# Patient Record
Sex: MALE | Race: WHITE | Employment: FULL TIME | URBAN - METROPOLITAN AREA
[De-identification: names, ages, dates, MRNs, and addresses within clinical notes are randomized per-mention and may not be internally consistent; named-entity substitution may affect disease eponyms.]

---

## 2019-07-07 ENCOUNTER — OFFICE VISIT (OUTPATIENT)
Dept: URGENT CARE | Age: 64
End: 2019-07-07

## 2019-07-07 VITALS
DIASTOLIC BLOOD PRESSURE: 84 MMHG | OXYGEN SATURATION: 98 % | BODY MASS INDEX: 28.95 KG/M2 | HEIGHT: 68 IN | WEIGHT: 191 LBS | SYSTOLIC BLOOD PRESSURE: 129 MMHG | HEART RATE: 87 BPM | TEMPERATURE: 98.6 F | RESPIRATION RATE: 18 BRPM

## 2019-07-07 DIAGNOSIS — M70.22 OLECRANON BURSITIS OF LEFT ELBOW: Primary | ICD-10-CM

## 2019-07-07 RX ORDER — TADALAFIL 5 MG/1
TABLET ORAL
Refills: 2 | COMMUNITY
Start: 2019-06-24

## 2019-07-07 RX ORDER — CEFDINIR 300 MG/1
300 CAPSULE ORAL 2 TIMES DAILY
Qty: 20 CAP | Refills: 0 | Status: SHIPPED | OUTPATIENT
Start: 2019-07-07 | End: 2019-08-06

## 2019-07-07 RX ORDER — LISINOPRIL 40 MG/1
TABLET ORAL
Refills: 4 | COMMUNITY
Start: 2019-05-03

## 2019-07-07 RX ORDER — ROSUVASTATIN CALCIUM 10 MG/1
10 TABLET, COATED ORAL DAILY
Refills: 2 | COMMUNITY
Start: 2019-04-26

## 2019-07-07 RX ORDER — DICLOFENAC SODIUM 75 MG/1
75 TABLET, DELAYED RELEASE ORAL 2 TIMES DAILY
Qty: 30 TAB | Refills: 0 | Status: SHIPPED | OUTPATIENT
Start: 2019-07-07 | End: 2019-08-06

## 2019-07-07 RX ORDER — MELOXICAM 15 MG/1
TABLET ORAL
Refills: 3 | COMMUNITY
Start: 2019-06-17 | End: 2022-10-20

## 2019-07-07 RX ORDER — AMOXICILLIN AND CLAVULANATE POTASSIUM 500; 125 MG/1; MG/1
TABLET, FILM COATED ORAL
Refills: 3 | COMMUNITY
Start: 2019-06-27 | End: 2019-08-06

## 2019-07-07 NOTE — PROGRESS NOTES
Elbow Pain          Past Medical History:   Diagnosis Date    Hypertension         History reviewed. No pertinent surgical history. History reviewed. No pertinent family history. Social History     Socioeconomic History    Marital status:      Spouse name: Not on file    Number of children: Not on file    Years of education: Not on file    Highest education level: Not on file   Occupational History    Not on file   Social Needs    Financial resource strain: Not on file    Food insecurity:     Worry: Not on file     Inability: Not on file    Transportation needs:     Medical: Not on file     Non-medical: Not on file   Tobacco Use    Smoking status: Never Smoker    Smokeless tobacco: Never Used   Substance and Sexual Activity    Alcohol use: Not on file    Drug use: Not on file    Sexual activity: Not on file   Lifestyle    Physical activity:     Days per week: Not on file     Minutes per session: Not on file    Stress: Not on file   Relationships    Social connections:     Talks on phone: Not on file     Gets together: Not on file     Attends Confucianist service: Not on file     Active member of club or organization: Not on file     Attends meetings of clubs or organizations: Not on file     Relationship status: Not on file    Intimate partner violence:     Fear of current or ex partner: Not on file     Emotionally abused: Not on file     Physically abused: Not on file     Forced sexual activity: Not on file   Other Topics Concern    Not on file   Social History Narrative    Not on file                ALLERGIES: Patient has no known allergies. Review of Systems    Vitals:    07/07/19 1305   BP: 129/84   Pulse: 87   Resp: 18   Temp: 98.6 °F (37 °C)   SpO2: 98%   Weight: 191 lb (86.6 kg)   Height: 5' 8\" (1.727 m)       Physical Exam   Musculoskeletal:        Left elbow: He exhibits decreased range of motion and swelling (with erythema and diffuse tenderness). Tenderness found. Olecranon process tenderness noted. Left forearm: He exhibits tenderness and swelling (with erythema ). Arms:  Nursing note and vitals reviewed. MDM    Procedures      ICD-10-CM ICD-9-CM    1. Olecranon bursitis of left elbow M70.22 726.33     with infection ? Medications Ordered Today   Medications    diclofenac EC (VOLTAREN) 75 mg EC tablet     Sig: Take 1 Tab by mouth two (2) times a day. Dispense:  30 Tab     Refill:  0    cefdinir (OMNICEF) 300 mg capsule     Sig: Take 1 Cap by mouth two (2) times a day. Dispense:  20 Cap     Refill:  0     No results found for any visits on 07/07/19. The patients condition was discussed with the patient and they understand. The patient is to follow up with primary care doctor. If signs and symptoms become worse the pt is to go to the ER. The patient is to take medications as prescribed.

## 2019-07-07 NOTE — PATIENT INSTRUCTIONS
Bursitis of the Elbow: Care Instructions  Your Care Instructions  Bursitis is pain and swelling of the bursae. These are sacs of fluid that help your joints move smoothly. Olecranon bursitis is a type of bursitis that affects the back of the elbow. This is sometimes called Kristopher elbow because the bump that develops looks like the cartoon character Kristopher's elbow. Injury, overuse, or prolonged pressure on your elbow can cause this form of bursitis. Sometimes it happens when people have arthritis. It also can occur for unknown reasons. Treatment may include draining fluid from the bursa with a needle. If your doctor thought there was infection, he or she may have prescribed antibiotics. You also may get shots of medicine into the bursa to help the swelling go down. Your elbow should get better in a few days or weeks. Follow-up care is a key part of your treatment and safety. Be sure to make and go to all appointments, and call your doctor if you are having problems. It's also a good idea to know your test results and keep a list of the medicines you take. How can you care for yourself at home? · Take pain medicines exactly as directed. ? If the doctor gave you a prescription medicine for pain, take it as prescribed. ? If you are not taking a prescription pain medicine, ask your doctor if you can take an over-the-counter medicine. ? Do not take two or more pain medicines at the same time unless the doctor told you to. Many pain medicines have acetaminophen, which is Tylenol. Too much acetaminophen (Tylenol) can be harmful. · If your doctor prescribed antibiotics, take them as directed. Do not stop taking them just because you feel better. You need to take the full course of antibiotics. · If your doctor gave you a sling, an elastic bandage, or a compression sleeve, wear it exactly as instructed. · Put ice or a cold pack on your elbow for 10 to 20 minutes at a time.  Try to do this every 1 to 2 hours for the next 3 days (when you are awake) or until the swelling goes down. Put a thin cloth between the ice and your skin. · After 3 days, you can try heat, or alternate heat and ice. · Rest your elbow. Try to stop or reduce any activity that causes pain. · Wear elbow pads during physical activity to prevent injury. · Do not lean your elbows on tables or armrests. When should you call for help? Call your doctor now or seek immediate medical care if:    · You have new or worse symptoms of infection, such as:  ? Increased pain, swelling, warmth, or redness. ? Red streaks leading from the area. ? Pus draining from the area. ? A fever.    Watch closely for changes in your health, and be sure to contact your doctor if:    · You do not get better as expected. Where can you learn more? Go to http://kirk-effie.info/. Enter  in the search box to learn more about \"Bursitis of the Elbow: Care Instructions. \"  Current as of: September 20, 2018  Content Version: 11.9  © 8569-0835 [employer]. Care instructions adapted under license by [employer] (which disclaims liability or warranty for this information). If you have questions about a medical condition or this instruction, always ask your healthcare professional. [address] any warranty or liability for your use of this information. Elbow Bursitis: Exercises  Your Care Instructions  Here are some examples of typical rehabilitation exercises for your condition. Start each exercise slowly. Ease off the exercise if you start to have pain. Your doctor or physical therapist will tell you when you can start these exercises and which ones will work best for you. How to do the exercises  Elbow flexion stretch    1. Lift the arm that bothers you, and bend the elbow. Your palm should face toward you. 2. With your other hand, gently push on the back of your affected forearm.  Press your hand toward your shoulder until you feel a stretch in the back of your upper arm. 3. Hold for at least 15 to 30 seconds. 4. Repeat 2 to 4 times. Elbow extension stretch    1. Extend your affected arm in front of you with your palm facing away from you. 2. Bend back your wrist, pointing your hand up toward the ceiling. 3. With your other hand, gently bend your wrist farther until you feel a mild to moderate stretch in your forearm. 4. Hold for at least 15 to 30 seconds. 5. Repeat 2 to 4 times. 6. Repeat steps 1 through 5. But this time extend your affected arm in front of you with your palm facing up. Then bend back your wrist, pointing your hand toward the floor. Pronation and supination stretch    1. Keep your affected elbow at your side, bent at about 90 degrees. Grasp a pen, pencil, or stick, and wrap your hand around it. If you don't have something to hold on to, make a fist instead. 2. Slowly turn your forearm as far as you can back and forth in each direction. Your hand should face up and then down. 3. Hold each position for about 6 seconds. 4. Relax for up to 10 seconds between repetitions. 5. Repeat 8 to 12 times. Hand flips    1. While seated, place your affected forearm on your thigh. Your palm should face down. 2. Flip your hand over so the back of your hand rests on your thigh and your palm is up. Alternate between palm up and palm down while keeping your forearm on your thigh. 3. Repeat 8 to 12 times. Follow-up care is a key part of your treatment and safety. Be sure to make and go to all appointments, and call your doctor if you are having problems. It's also a good idea to know your test results and keep a list of the medicines you take. Where can you learn more? Go to http://kirk-effie.info/. Enter G771 in the search box to learn more about \"Elbow Bursitis: Exercises. \"  Current as of: September 20, 2018  Content Version: 11.9  © 1284-7897 [employer]. Care instructions adapted under license by [employer] (which disclaims liability or warranty for this information). If you have questions about a medical condition or this instruction, always ask your healthcare professional. [address] any warranty or liability for your use of this information.

## 2019-08-06 ENCOUNTER — OFFICE VISIT (OUTPATIENT)
Dept: URGENT CARE | Age: 64
End: 2019-08-06

## 2019-08-06 VITALS
BODY MASS INDEX: 29.51 KG/M2 | HEIGHT: 68 IN | SYSTOLIC BLOOD PRESSURE: 124 MMHG | DIASTOLIC BLOOD PRESSURE: 74 MMHG | WEIGHT: 194.7 LBS | OXYGEN SATURATION: 96 % | TEMPERATURE: 99.3 F | RESPIRATION RATE: 18 BRPM | HEART RATE: 88 BPM

## 2019-08-06 DIAGNOSIS — R07.89 CHEST DISCOMFORT: Primary | ICD-10-CM

## 2019-08-06 RX ORDER — AMOXICILLIN AND CLAVULANATE POTASSIUM 500; 125 MG/1; MG/1
TABLET, FILM COATED ORAL
COMMUNITY
End: 2021-09-23

## 2019-08-06 NOTE — PATIENT INSTRUCTIONS

## 2020-02-20 NOTE — PROGRESS NOTES
Pt needs refill on TOPROL-XL) 25 MG       [facility] [phone]    The history is provided by the patient. Chest Pain (Angina)    The history is provided by the patient. This is a recurrent problem. Episode onset: 3 days. The problem has not changed since onset. Episode frequency: intermittently. Associated with: nothing. The pain is present in the right side. The pain is at a severity of 2/10. The pain is mild. The quality of the pain is described as pressure-like, intermittent and dull. The pain does not radiate. Exacerbated by: nothing. Pertinent negatives include no abdominal pain, no back pain, no cough, no diaphoresis, no dizziness, no exertional chest pressure, no fever, no headaches, no irregular heartbeat, no leg pain, no lower extremity edema, no malaise/fatigue, no nausea, no numbness, no palpitations, no shortness of breath, no vomiting and no weakness. He has tried nothing for the symptoms. Risk factors include hypertension. His past medical history is significant for HTN. Patient states he have seen a cardiologist 6 months ago and everything showed normal.  Patient stated within the last 3 months his PCP has increased his HTN medication. Patient described the symptoms as more discomfort than pain. Denies SOB, Chest pain and dizziness. Past Medical History:   Diagnosis Date    Hypertension         History reviewed. No pertinent surgical history. History reviewed. No pertinent family history.      Social History     Socioeconomic History    Marital status:      Spouse name: Not on file    Number of children: Not on file    Years of education: Not on file    Highest education level: Not on file   Occupational History    Not on file   Social Needs    Financial resource strain: Not on file    Food insecurity:     Worry: Not on file     Inability: Not on file    Transportation needs:     Medical: Not on file     Non-medical: Not on file   Tobacco Use    Smoking status: Never Smoker    Smokeless tobacco: Never Used   Substance and Sexual Activity    Alcohol use: Not on file    Drug use: Not on file    Sexual activity: Not on file   Lifestyle    Physical activity:     Days per week: Not on file     Minutes per session: Not on file    Stress: Not on file   Relationships    Social connections:     Talks on phone: Not on file     Gets together: Not on file     Attends Yarsani service: Not on file     Active member of club or organization: Not on file     Attends meetings of clubs or organizations: Not on file     Relationship status: Not on file    Intimate partner violence:     Fear of current or ex partner: Not on file     Emotionally abused: Not on file     Physically abused: Not on file     Forced sexual activity: Not on file   Other Topics Concern    Not on file   Social History Narrative    Not on file            ALLERGIES: Patient has no known allergies. Review of Systems   Constitutional: Negative for chills, diaphoresis, fatigue, fever and malaise/fatigue. HENT: Negative. Eyes: Negative. Respiratory: Negative for apnea, cough, chest tightness, shortness of breath and wheezing. Cardiovascular: Positive for chest pain (more of chest discomfort). Negative for palpitations and leg swelling. Gastrointestinal: Negative. Negative for abdominal pain, nausea, rectal pain and vomiting. Genitourinary: Negative. Musculoskeletal: Negative. Negative for back pain. Skin: Negative. Neurological: Negative for dizziness, syncope, weakness, light-headedness, numbness and headaches. Vitals:    08/06/19 1304   BP: 124/74   Pulse: 88   Resp: 18   Temp: 99.3 °F (37.4 °C)   SpO2: 96%   Weight: 194 lb 11.2 oz (88.3 kg)   Height: 5' 8\" (1.727 m)       Physical Exam   Constitutional: He is oriented to person, place, and time. He appears well-developed and well-nourished. Eyes: Pupils are equal, round, and reactive to light. Neck: Normal range of motion. Cardiovascular: Normal rate, regular rhythm and normal heart sounds. Pulmonary/Chest: Effort normal and breath sounds normal. No respiratory distress. He has no wheezes. He has no rales. Abdominal: Soft. Bowel sounds are normal.   Musculoskeletal: Normal range of motion. Lymphadenopathy:     He has no cervical adenopathy. Neurological: He is alert and oriented to person, place, and time. Skin: Skin is warm and dry. Psychiatric: He has a normal mood and affect. MDM     Differential Diagnosis; Clinical Impression; Plan:     (R07.89) Chest discomfort  (primary encounter diagnosis)  No orders of the defined types were placed in this encounter. Informed patient of EKG results completed today which shows possible left atrial enlargement and nonspecific T wave abnormality. Education was given about the test result findings today. Advised patient to make a follow up appointment with PCP and cardiologist for a workup. Patient agreed with plan of care. The patients condition was discussed with the patient and they understand. Advised patient if signs and symptoms become worse to go to the ER. The patient is to take medications as prescribed. AVS given with patient instructions upon discharge. Patient agreed with plan of care.                   Procedures

## 2021-09-23 ENCOUNTER — OFFICE VISIT (OUTPATIENT)
Dept: INTERNAL MEDICINE CLINIC | Age: 66
End: 2021-09-23
Payer: MEDICARE

## 2021-09-23 VITALS
TEMPERATURE: 97.8 F | SYSTOLIC BLOOD PRESSURE: 127 MMHG | RESPIRATION RATE: 18 BRPM | HEIGHT: 68 IN | OXYGEN SATURATION: 95 % | BODY MASS INDEX: 28.92 KG/M2 | WEIGHT: 190.8 LBS | DIASTOLIC BLOOD PRESSURE: 80 MMHG | HEART RATE: 76 BPM

## 2021-09-23 DIAGNOSIS — L11.1 GROVER'S DISEASE: ICD-10-CM

## 2021-09-23 DIAGNOSIS — E78.2 MIXED HYPERLIPIDEMIA: Primary | ICD-10-CM

## 2021-09-23 DIAGNOSIS — R35.1 BENIGN PROSTATIC HYPERPLASIA WITH NOCTURIA: ICD-10-CM

## 2021-09-23 DIAGNOSIS — Z11.59 NEED FOR HEPATITIS C SCREENING TEST: ICD-10-CM

## 2021-09-23 DIAGNOSIS — Z23 ENCOUNTER FOR IMMUNIZATION: ICD-10-CM

## 2021-09-23 DIAGNOSIS — N40.1 BENIGN PROSTATIC HYPERPLASIA WITH NOCTURIA: ICD-10-CM

## 2021-09-23 DIAGNOSIS — I10 ESSENTIAL HYPERTENSION: ICD-10-CM

## 2021-09-23 PROCEDURE — 3017F COLORECTAL CA SCREEN DOC REV: CPT | Performed by: INTERNAL MEDICINE

## 2021-09-23 PROCEDURE — G8536 NO DOC ELDER MAL SCRN: HCPCS | Performed by: INTERNAL MEDICINE

## 2021-09-23 PROCEDURE — G0463 HOSPITAL OUTPT CLINIC VISIT: HCPCS | Performed by: INTERNAL MEDICINE

## 2021-09-23 PROCEDURE — 90694 VACC AIIV4 NO PRSRV 0.5ML IM: CPT | Performed by: INTERNAL MEDICINE

## 2021-09-23 PROCEDURE — 99205 OFFICE O/P NEW HI 60 MIN: CPT | Performed by: INTERNAL MEDICINE

## 2021-09-23 PROCEDURE — G8419 CALC BMI OUT NRM PARAM NOF/U: HCPCS | Performed by: INTERNAL MEDICINE

## 2021-09-23 PROCEDURE — G8510 SCR DEP NEG, NO PLAN REQD: HCPCS | Performed by: INTERNAL MEDICINE

## 2021-09-23 PROCEDURE — G8427 DOCREV CUR MEDS BY ELIG CLIN: HCPCS | Performed by: INTERNAL MEDICINE

## 2021-09-23 PROCEDURE — 1101F PT FALLS ASSESS-DOCD LE1/YR: CPT | Performed by: INTERNAL MEDICINE

## 2021-09-23 RX ORDER — AMOXICILLIN AND CLAVULANATE POTASSIUM 500; 125 MG/1; MG/1
1 TABLET, FILM COATED ORAL 2 TIMES DAILY
COMMUNITY
Start: 2021-09-23

## 2021-09-23 RX ORDER — TETANUS TOXOID, REDUCED DIPHTHERIA TOXOID AND ACELLULAR PERTUSSIS VACCINE, ADSORBED 5; 2.5; 8; 8; 2.5 [IU]/.5ML; [IU]/.5ML; UG/.5ML; UG/.5ML; UG/.5ML
0.5 SUSPENSION INTRAMUSCULAR ONCE
Qty: 0.5 ML | Refills: 0 | Status: SHIPPED | OUTPATIENT
Start: 2021-09-23 | End: 2021-09-23

## 2021-09-23 RX ORDER — TRIAMCINOLONE ACETONIDE 1 MG/G
CREAM TOPICAL 2 TIMES DAILY
Qty: 80 G | Refills: 2 | Status: ON HOLD | OUTPATIENT
Start: 2021-09-23 | End: 2022-10-20

## 2021-09-23 NOTE — PATIENT INSTRUCTIONS
Vaccine Information Statement    Influenza (Flu) Vaccine (Inactivated or Recombinant): What You Need to Know    Many vaccine information statements are available in Pashto and other languages. See www.immunize.org/vis. Hojas de información sobre vacunas están disponibles en español y en muchos otros idiomas. Visite www.immunize.org/vis. 1. Why get vaccinated? Influenza vaccine can prevent influenza (flu). Flu is a contagious disease that spreads around the [facility] every year, usually between October and May. Anyone can get the flu, but it is more dangerous for some people. Infants and young children, people 72 years and older, pregnant people, and people with certain health conditions or a weakened immune system are at greatest risk of flu complications. Pneumonia, bronchitis, sinus infections, and ear infections are examples of flu-related complications. If you have a medical condition, such as heart disease, cancer, or diabetes, flu can make it worse. Flu can cause fever and chills, sore throat, muscle aches, fatigue, cough, headache, and runny or stuffy nose. Some people may have vomiting and diarrhea, though this is more common in children than adults. In an average year, thousands of people in the [facility] die from flu, and many more are hospitalized. Flu vaccine prevents millions of illnesses and flu-related visits to the doctor each year. 2. Influenza vaccines     CDC recommends everyone 6 months and older get vaccinated every flu season. Children 6 months through 6years of age may need 2 doses during a single flu season. Everyone else needs only 1 dose each flu season. It takes about 2 weeks for protection to develop after vaccination. There are many flu viruses, and they are always changing. Each year a new flu vaccine is made to protect against the influenza viruses believed to be likely to cause disease in the upcoming flu season.  Even when the vaccine doesnt exactly match these viruses, it may still provide some protection. Influenza vaccine does not cause flu. Influenza vaccine may be given at the same time as other vaccines. 3. Talk with your health care provider    Tell your vaccination provider if the person getting the vaccine:   Has had an allergic reaction after a previous dose of influenza vaccine, or has any severe, life-threatening allergies    Has ever had Guillain-Barré Syndrome (also called GBS)    In some cases, your health care provider may decide to postpone influenza vaccination until a future visit. Influenza vaccine can be administered at any time during pregnancy. People who are or will be pregnant during influenza season should receive inactivated influenza vaccine. People with minor illnesses, such as a cold, may be vaccinated. People who are moderately or severely ill should usually wait until they recover before getting influenza vaccine. Your health care provider can give you more information. 4. Risks of a vaccine reaction     Soreness, redness, and swelling where the shot is given, fever, muscle aches, and headache can happen after influenza vaccination.  There may be a very small increased risk of Guillain-Barré Syndrome (GBS) after inactivated influenza vaccine (the flu shot). Joanna Howard children who get the flu shot along with pneumococcal vaccine (PCV13) and/or DTaP vaccine at the same time might be slightly more likely to have a seizure caused by fever. Tell your health care provider if a child who is getting flu vaccine has ever had a seizure. People sometimes faint after medical procedures, including vaccination. Tell your provider if you feel dizzy or have vision changes or ringing in the ears. As with any medicine, there is a very remote chance of a vaccine causing a severe allergic reaction, other serious injury, or death. 5. What if there is a serious problem?     An allergic reaction could occur after the vaccinated person leaves the clinic. If you see signs of a severe allergic reaction (hives, swelling of the face and throat, difficulty breathing, a fast heartbeat, dizziness, or weakness), call 9-1-1 and get the person to the nearest hospital.    For other signs that concern you, call your health care provider. Adverse reactions should be reported to the Vaccine Adverse Event Reporting System (VAERS). Your health care provider will usually file this report, or you can do it yourself. Visit the VAERS website at www.vaers. [facility].gov or call 2-499.658.5365. VAERS is only for reporting reactions, and VAERS staff members do not give medical advice. 6. The National Vaccine Injury Compensation Program    The [facility] Vaccine Injury Compensation Program (VICP) is a federal program that was created to compensate people who may have been injured by certain vaccines. Claims regarding alleged injury or death due to vaccination have a time limit for filing, which may be as short as two years. Visit the VICP website at www.[facility].gov/vaccinecompensation or call 8-561.141.9978 to learn about the program and about filing a claim. 7. How can I learn more?  Ask your health care provider.  Call your local or state health department.  Visit the website of the Food and Drug Administration (FDA) for vaccine package inserts and additional information at www.fda.gov/vaccines-blood-biologics/vaccines.  Contact the Centers for Disease Control and Prevention (CDC):  - Call 4-854.450.2581 (1-800-CDC-INFO) or  - Visit CDCs influenza website at www.cdc.gov/flu. Vaccine Information Statement   Inactivated Influenza Vaccine   8/6/2021  42 MERI Malloy Leader [id number]   Department of Health and Human Services  Centers for Disease Control and Prevention    Office Use Only

## 2021-09-23 NOTE — PROGRESS NOTES
Chastity Mesa  is a 74 y.o.  male  who present for routine immunizations. Prior to vaccine administration: Consent was obtained. Risks and adverse reactions were discussed. The patient was provided the VIS and they were given an opportunity to ask questions; all questions were addressed. He  denies any symptoms, reactions or allergies that would exclude them from being immunized today. There were no adverse reactions observed post vaccination. Patient was advised to seek medical or call the office with any questions or concerns post vaccination. Patient verbalized understanding.   Shadi Dean LPN

## 2021-09-23 NOTE — PROGRESS NOTES
HPI:  Monserrat Mosquera is a 74y.o. year old male who is here for new patient appointment after a long absence. He has been seeing his cardiologist regularly. He sees urology regularly. He is also seeing dermatology. He has been diagnosed with Marblehead's disease. Recently he was in [facility] and had significant blistering on his chest and his back that he attributed to a particular sunscreen. He stopped using that and it is slightly better. He does have some intermittent pain in his thumbs. Some left knee pain off and on. Review of systems is otherwise negative. Past Medical History:   Diagnosis Date    Hypertension        Past Surgical History:   Procedure Laterality Date    HX CARPAL TUNNEL RELEASE Bilateral     HX KNEE ARTHROSCOPY Bilateral     HX ORTHOPAEDIC Left 1970 and 1971    Knee arthroscopy    IR BX LUNG/MEDIASTINUM NDL PERC S&I W IMAGING Left        Prior to Admission medications    Medication Sig Start Date End Date Taking? Authorizing Provider   james,juju,ainsley,,tetanus (Boostrix Tdap) 2.5-8-5 Lf-mcg-Lf/0.5mL susp suspension 0.5 mL by IntraMUSCular route once for 1 dose. Indications: vaccination to prevent diphtheria, pertussis and tetanus 9/23/21 9/23/21 Yes Bambi Santiago MD   amoxicillin-clavulanate (Augmentin) 500-125 mg per tablet Take 1 Tablet by mouth two (2) times a day. 9/23/21  Yes Bambi Santiago MD   triamcinolone acetonide (KENALOG) 0.1 % topical cream Apply  to affected area two (2) times a day.  use thin layer 9/23/21  Yes Bambi Santiago MD   lisinopril (PRINIVIL, ZESTRIL) 40 mg tablet  5/3/19  Yes Provider, Historical   meloxicam (MOBIC) 15 mg tablet TAKE 1 TABLET BY MOUTH ONCE DAILY AFTER FOOD 6/17/19  Yes Provider, Historical   rosuvastatin (CRESTOR) 10 mg tablet  4/26/19  Yes Provider, Historical   tadalafil (CIALIS) 5 mg tablet TAKE 1 TABLET BY MOUTH ONCE DAILY FOR BPH. 6/24/19  Yes Provider, Historical   amoxicillin-clavulanate (AUGMENTIN) 500-125 mg per tablet Take  by mouth.  9/23/21  Provider, Historical       Social History     Socioeconomic History    Marital status:      Spouse name: Not on file    Number of children: Not on file    Years of education: Not on file    Highest education level: Not on file   Occupational History    Not on file   Tobacco Use    Smoking status: Never Smoker    Smokeless tobacco: Never Used   Vaping Use    Vaping Use: Never used   Substance and Sexual Activity    Alcohol use: Yes     Alcohol/week: 10.0 standard drinks     Types: 10 Glasses of wine per week    Drug use: Never    Sexual activity: Not on file   Other Topics Concern    Not on file   Social History Narrative    Not on file     Social Determinants of Health     Financial Resource Strain:     Difficulty of Paying Living Expenses:    Food Insecurity:     Worried About Running Out of Food in the Last Year:     [address] in the Last Year:    Transportation Needs:     Lack of Transportation (Medical):      Lack of Transportation (Non-Medical):    Physical Activity:     Days of Exercise per Week:     Minutes of Exercise per Session:    Stress:     Feeling of Stress :    Social Connections:     Frequency of Communication with Friends and Family:     Frequency of Social Gatherings with Friends and Family:     Attends Christianity Services:     Active Member of Clubs or Organizations:     Attends Club or Organization Meetings:     Marital Status:    Intimate Partner Violence:     Fear of Current or Ex-Partner:     Emotionally Abused:     Physically Abused:     Sexually Abused:           ROS  Per HPI    Visit Vitals  /80 (BP 1 Location: Left upper arm, BP Patient Position: Sitting, BP Cuff Size: Adult)   Pulse 76   Temp 97.8 °F (36.6 °C) (Temporal)   Resp 18   Ht 5' 8\" (1.727 m)   Wt 190 lb 12.8 oz (86.5 kg)   SpO2 95%   BMI 29.01 kg/m²         Physical Exam   Physical Examination: General appearance - alert, well appearing, and in no distress  Ears - bilateral TM's and external ear canals normal  Mouth - mucous membranes moist, pharynx normal without lesions  Neck - supple, no significant adenopathy  Lymphatics - no palpable lymphadenopathy, no hepatosplenomegaly  Chest - clear to auscultation, no wheezes, rales or rhonchi, symmetric air entry  Heart - normal rate, regular rhythm, normal S1, S2, no murmurs, rubs, clicks or gallops  Abdomen - soft, nontender, nondistended, no masses or organomegaly  Neurological - alert, oriented, normal speech, no focal findings or movement disorder noted  Musculoskeletal - no joint tenderness, deformity or swelling  Extremities - peripheral pulses normal, no pedal edema, no clubbing or cyanosis  Skin - Scaly papules scattered across his chest and back. Assessment/Plan:  Diagnoses and all orders for this visit:    1. Mixed hyperlipidemia - LDL goal of 100. Check labs to be sure that is met. -     LIPID PANEL; Future  -     METABOLIC PANEL, COMPREHENSIVE; Future  -     CBC WITH AUTOMATED DIFF; Future  -     TSH 3RD GENERATION; Future    2. Need for hepatitis C screening test  -     HEPATITIS C AB; Future    3. Encounter for immunization  -     FLU (FLUAD QUAD INFLUENZA VACCINE,QUAD,ADJUVANTED)    4. Essential hypertension - BP well controlled. 5. Benign prostatic hyperplasia with nocturia - stable on meds. 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 Other orders  -     diphth,pertus,acell,,tetanus (Boostrix Tdap) 2.5-8-5 Lf-mcg-Lf/0.5mL susp suspension; 0.5 mL by IntraMUSCular route once for 1 dose. Indications: vaccination to prevent diphtheria, pertussis and tetanus  -     triamcinolone acetonide (KENALOG) 0.1 % topical cream; Apply  to affected area two (2) times a day.  use thin layer              Advised him to call back or return to office if symptoms worsen/change/persist.  Discussed expected course/resolution/complications of diagnosis in detail with patient. Medication risks/benefits/costs/interactions/alternatives discussed with patient. He was given an after visit summary which includes diagnoses, current medications, & vitals. He expressed understanding with the diagnosis and plan.

## 2021-09-23 NOTE — PROGRESS NOTES
Chief Complaint   Patient presents with   [address] Immunization/Injection     HD flu shot          1. Have you been to the ER, urgent care clinic since your last visit? Hospitalized since your last visit? No    2. Have you seen or consulted any other health care providers outside of the [address] [place] [address] since your last visit? Include any pap smears or colon screening.  No

## 2021-09-28 ENCOUNTER — APPOINTMENT (OUTPATIENT)
Dept: INTERNAL MEDICINE CLINIC | Age: 66
End: 2021-09-28

## 2021-09-28 DIAGNOSIS — E78.2 MIXED HYPERLIPIDEMIA: ICD-10-CM

## 2021-09-28 DIAGNOSIS — Z11.59 NEED FOR HEPATITIS C SCREENING TEST: ICD-10-CM

## 2021-09-28 LAB
ALBUMIN SERPL-MCNC: 3.7 G/DL (ref 3.5–5)
ALBUMIN/GLOB SERPL: 1.2 {RATIO} (ref 1.1–2.2)
ALP SERPL-CCNC: 54 U/L (ref 45–117)
ALT SERPL-CCNC: 35 U/L (ref 12–78)
ANION GAP SERPL CALC-SCNC: 5 MMOL/L (ref 5–15)
AST SERPL-CCNC: 11 U/L (ref 15–37)
BASOPHILS # BLD: 0 K/UL (ref 0–0.1)
BASOPHILS NFR BLD: 1 % (ref 0–1)
BILIRUB SERPL-MCNC: 0.7 MG/DL (ref 0.2–1)
BUN SERPL-MCNC: 12 MG/DL (ref 6–20)
BUN/CREAT SERPL: 20 (ref 12–20)
CALCIUM SERPL-MCNC: 10 MG/DL (ref 8.5–10.1)
CHLORIDE SERPL-SCNC: 108 MMOL/L (ref 97–108)
CHOLEST SERPL-MCNC: 145 MG/DL
CO2 SERPL-SCNC: 25 MMOL/L (ref 21–32)
CREAT SERPL-MCNC: 0.6 MG/DL (ref 0.7–1.3)
DIFFERENTIAL METHOD BLD: NORMAL
EOSINOPHIL # BLD: 0.1 K/UL (ref 0–0.4)
EOSINOPHIL NFR BLD: 2 % (ref 0–7)
ERYTHROCYTE [DISTWIDTH] IN BLOOD BY AUTOMATED COUNT: 12.5 % (ref 11.5–14.5)
GLOBULIN SER CALC-MCNC: 3 G/DL (ref 2–4)
GLUCOSE SERPL-MCNC: 105 MG/DL (ref 65–100)
HCT VFR BLD AUTO: 46.3 % (ref 36.6–50.3)
HCV AB SERPL QL IA: NONREACTIVE
HDLC SERPL-MCNC: 76 MG/DL
HDLC SERPL: 1.9 {RATIO} (ref 0–5)
HGB BLD-MCNC: 15 G/DL (ref 12.1–17)
IMM GRANULOCYTES # BLD AUTO: 0 K/UL (ref 0–0.04)
IMM GRANULOCYTES NFR BLD AUTO: 0 % (ref 0–0.5)
LDLC SERPL CALC-MCNC: 56 MG/DL (ref 0–100)
LYMPHOCYTES # BLD: 1.3 K/UL (ref 0.8–3.5)
LYMPHOCYTES NFR BLD: 26 % (ref 12–49)
MCH RBC QN AUTO: 29.6 PG (ref 26–34)
MCHC RBC AUTO-ENTMCNC: 32.4 G/DL (ref 30–36.5)
MCV RBC AUTO: 91.5 FL (ref 80–99)
MONOCYTES # BLD: 0.5 K/UL (ref 0–1)
MONOCYTES NFR BLD: 9 % (ref 5–13)
NEUTS SEG # BLD: 3.2 K/UL (ref 1.8–8)
NEUTS SEG NFR BLD: 62 % (ref 32–75)
NRBC # BLD: 0 K/UL (ref 0–0.01)
NRBC BLD-RTO: 0 PER 100 WBC
PLATELET # BLD AUTO: 231 K/UL (ref 150–400)
PMV BLD AUTO: 9.6 FL (ref 8.9–12.9)
POTASSIUM SERPL-SCNC: 4.4 MMOL/L (ref 3.5–5.1)
PROT SERPL-MCNC: 6.7 G/DL (ref 6.4–8.2)
RBC # BLD AUTO: 5.06 M/UL (ref 4.1–5.7)
SODIUM SERPL-SCNC: 138 MMOL/L (ref 136–145)
TRIGL SERPL-MCNC: 65 MG/DL (ref ?–150)
TSH SERPL DL<=0.05 MIU/L-ACNC: 0.89 UIU/ML (ref 0.36–3.74)
VLDLC SERPL CALC-MCNC: 13 MG/DL
WBC # BLD AUTO: 5.1 K/UL (ref 4.1–11.1)

## 2022-03-17 ENCOUNTER — TRANSCRIBE ORDER (OUTPATIENT)
Dept: SCHEDULING | Age: 67
End: 2022-03-17

## 2022-03-17 DIAGNOSIS — M75.102 ROTATOR CUFF SYNDROME OF LEFT SHOULDER: ICD-10-CM

## 2022-03-17 DIAGNOSIS — S46.212A TRAUMATIC PARTIAL TEAR OF LEFT BICEPS TENDON: Primary | ICD-10-CM

## 2022-03-18 ENCOUNTER — HOSPITAL ENCOUNTER (OUTPATIENT)
Dept: MRI IMAGING | Age: 67
Discharge: HOME | End: 2022-03-18
Attending: ORTHOPAEDIC SURGERY
Payer: MEDICARE

## 2022-03-18 DIAGNOSIS — S46.212A TRAUMATIC PARTIAL TEAR OF LEFT BICEPS TENDON: ICD-10-CM

## 2022-03-18 DIAGNOSIS — M75.102 ROTATOR CUFF SYNDROME OF LEFT SHOULDER: ICD-10-CM

## 2022-03-18 PROCEDURE — 73221 MRI JOINT UPR EXTREM W/O DYE: CPT

## 2022-09-26 ENCOUNTER — OFFICE VISIT (OUTPATIENT)
Dept: INTERNAL MEDICINE CLINIC | Age: 67
End: 2022-09-26
Payer: MEDICARE

## 2022-09-26 VITALS
DIASTOLIC BLOOD PRESSURE: 77 MMHG | HEART RATE: 74 BPM | WEIGHT: 188 LBS | RESPIRATION RATE: 14 BRPM | HEIGHT: 68 IN | TEMPERATURE: 97.6 F | SYSTOLIC BLOOD PRESSURE: 143 MMHG | OXYGEN SATURATION: 96 % | BODY MASS INDEX: 28.49 KG/M2

## 2022-09-26 DIAGNOSIS — I10 ESSENTIAL HYPERTENSION: ICD-10-CM

## 2022-09-26 DIAGNOSIS — N40.1 BENIGN PROSTATIC HYPERPLASIA WITH NOCTURIA: ICD-10-CM

## 2022-09-26 DIAGNOSIS — R35.1 BENIGN PROSTATIC HYPERPLASIA WITH NOCTURIA: ICD-10-CM

## 2022-09-26 DIAGNOSIS — Z12.5 PROSTATE CANCER SCREENING: ICD-10-CM

## 2022-09-26 DIAGNOSIS — Z23 ENCOUNTER FOR IMMUNIZATION: ICD-10-CM

## 2022-09-26 DIAGNOSIS — N40.1 BENIGN PROSTATIC HYPERPLASIA WITH LOWER URINARY TRACT SYMPTOMS, SYMPTOM DETAILS UNSPECIFIED: ICD-10-CM

## 2022-09-26 DIAGNOSIS — E78.2 MIXED HYPERLIPIDEMIA: ICD-10-CM

## 2022-09-26 DIAGNOSIS — Z00.00 MEDICARE ANNUAL WELLNESS VISIT, SUBSEQUENT: Primary | ICD-10-CM

## 2022-09-26 PROCEDURE — 1101F PT FALLS ASSESS-DOCD LE1/YR: CPT | Performed by: INTERNAL MEDICINE

## 2022-09-26 PROCEDURE — G8510 SCR DEP NEG, NO PLAN REQD: HCPCS | Performed by: INTERNAL MEDICINE

## 2022-09-26 PROCEDURE — 99214 OFFICE O/P EST MOD 30 MIN: CPT | Performed by: INTERNAL MEDICINE

## 2022-09-26 PROCEDURE — G0463 HOSPITAL OUTPT CLINIC VISIT: HCPCS | Performed by: INTERNAL MEDICINE

## 2022-09-26 PROCEDURE — 3017F COLORECTAL CA SCREEN DOC REV: CPT | Performed by: INTERNAL MEDICINE

## 2022-09-26 PROCEDURE — G8536 NO DOC ELDER MAL SCRN: HCPCS | Performed by: INTERNAL MEDICINE

## 2022-09-26 PROCEDURE — 90694 VACC AIIV4 NO PRSRV 0.5ML IM: CPT | Performed by: INTERNAL MEDICINE

## 2022-09-26 PROCEDURE — G8427 DOCREV CUR MEDS BY ELIG CLIN: HCPCS | Performed by: INTERNAL MEDICINE

## 2022-09-26 PROCEDURE — G8417 CALC BMI ABV UP PARAM F/U: HCPCS | Performed by: INTERNAL MEDICINE

## 2022-09-26 PROCEDURE — G0439 PPPS, SUBSEQ VISIT: HCPCS | Performed by: INTERNAL MEDICINE

## 2022-09-26 RX ORDER — TETANUS TOXOID, REDUCED DIPHTHERIA TOXOID AND ACELLULAR PERTUSSIS VACCINE, ADSORBED 5; 2.5; 8; 8; 2.5 [IU]/.5ML; [IU]/.5ML; UG/.5ML; UG/.5ML; UG/.5ML
0.5 SUSPENSION INTRAMUSCULAR ONCE
Qty: 0.5 ML | Refills: 0 | Status: SHIPPED | OUTPATIENT
Start: 2022-09-26 | End: 2022-09-26

## 2022-09-26 NOTE — PROGRESS NOTES
This is the Subsequent Medicare Annual Wellness Exam, performed 12 months or more after the Initial AWV or the last Subsequent AWV    I have reviewed the patient's medical history in detail and updated the computerized patient record. Also for follow-up of his health issues. He has not been able to exercise as much due to issues recently with his left knee as well as his left shoulder. He does have surgery scheduled for October for the left shoulder for rotator cuff issues, arthritis issues, and biceps tendon tear. He does have some ongoing issues with right shoulder pain as well. Some left knee pain as well that is stable. ROS - Per HPI. Seeing derm and urology regularly. Some lower back pain as well that is intermittent and not radicular. Assessment/Plan   Education and counseling provided:  Are appropriate based on today's review and evaluation  Influenza Vaccine  Prostate cancer screening tests (PSA, covered annually)  Diabetes screening test    1. Medicare annual wellness visit, subsequent  2. Mixed hyperlipidemia-LDL goal of 100. Tolerating statin drugs well. -     LIPID PANEL; Future  -     METABOLIC PANEL, COMPREHENSIVE; Future  -     CBC WITH AUTOMATED DIFF; Future  -     TSH 3RD GENERATION; Future  3. Benign prostatic hyperplasia with lower urinary tract symptoms, symptom details unspecified-symptoms stable. Check PSA at his request and provide to urology. 4. Essential hypertension-blood pressure reasonably controlled. We will continue to monitor.  -     LIPID PANEL; Future  -     METABOLIC PANEL, COMPREHENSIVE; Future  -     CBC WITH AUTOMATED DIFF; Future  -     TSH 3RD GENERATION; Future  5. Benign prostatic hyperplasia with nocturia  6. Prostate cancer screening  -     PSA SCREENING (SCREENING); Future  7. Encounter for immunization  -     INFLUENZA, FLUAD, (AGE 65 Y+), IM, PF, 0.5 ML  8. Lumbar strain and degenerative change in the knee and shoulders.   Suggested physical therapy and he will consider that. Depression Risk Factor Screening     3 most recent PHQ Screens 9/26/2022   Little interest or pleasure in doing things Not at all   Feeling down, depressed, irritable, or hopeless Not at all   Total Score PHQ 2 0       Alcohol & Drug Abuse Risk Screen    Do you average more than 1 drink per night or more than 7 drinks a week: Yes    In the past three months have you have had more than 4 drinks containing alcohol on one occasion: No          Functional Ability and Level of Safety    Hearing: Hearing is good. Activities of Daily Living: The home contains: no safety equipment. Patient does total self care      Ambulation: with no difficulty     Fall Risk:  Fall Risk Assessment, last 12 mths 9/26/2022   Able to walk? Yes   Fall in past 12 months? 0   Do you feel unsteady?  0   Are you worried about falling 0      Abuse Screen:  Patient is not abused       Cognitive Screening    Has your family/caregiver stated any concerns about your memory: no         Health Maintenance Due     Health Maintenance Due   Topic Date Due    DTaP/Tdap/Td series (1 - Tdap) Never done    Colorectal Cancer Screening Combo  Never done    COVID-19 Vaccine (3 - Booster for Brent Lords series) 09/04/2021    Flu Vaccine (1) 08/01/2022    Depression Screen  09/23/2022    Lipid Screen  09/28/2022       Patient Care Team   Patient Care Team:  Liz Carmona MD as PCP - General (Internal Medicine Physician)  Liz Carmona MD as PCP - [facility] Empaneled Provider    History     Patient Active Problem List   Diagnosis Code    Mixed hyperlipidemia E78.2    Benign prostatic hyperplasia with lower urinary tract symptoms, symptom details unspecified N40.1    Essential hypertension I10     Past Medical History:   Diagnosis Date    Hypertension       Past Surgical History:   Procedure Laterality Date    HX CARPAL TUNNEL RELEASE Bilateral     HX KNEE ARTHROSCOPY Bilateral     HX ORTHOPAEDIC Left 1970 and 1971    Knee arthroscopy    IR BX LUNG/MEDIASTINUM NDL PERC S&I W IMAGING Left      Current Outpatient Medications   Medication Sig Dispense Refill    amoxicillin-clavulanate (AUGMENTIN) 500-125 mg per tablet Take 1 Tablet by mouth two (2) times a day. lisinopril (PRINIVIL, ZESTRIL) 40 mg tablet   4    meloxicam (MOBIC) 15 mg tablet TAKE 1 TABLET BY MOUTH ONCE DAILY AFTER FOOD  3    rosuvastatin (CRESTOR) 10 mg tablet   2    tadalafil (CIALIS) 5 mg tablet TAKE 1 TABLET BY MOUTH ONCE DAILY FOR BPH.  2    triamcinolone acetonide (KENALOG) 0.1 % topical cream Apply  to affected area two (2) times a day. use thin layer 80 g 2     No Known Allergies    Family History   Problem Relation Age of Onset    Dementia Mother     Coronary Art Dis Father     Prostate Cancer Father      Social History     Tobacco Use    Smoking status: Never    Smokeless tobacco: Never   Substance Use Topics    Alcohol use:  Yes     Alcohol/week: 10.0 standard drinks     Types: 10 Glasses of wine per week         Prudence Corbett MD

## 2022-09-26 NOTE — PATIENT INSTRUCTIONS
Medicare Wellness Visit, Male    The best way to live healthy is to have a lifestyle where you eat a well-balanced diet, exercise regularly, limit alcohol use, and quit all forms of tobacco/nicotine, if applicable. Regular preventive services are another way to keep healthy. Preventive services (vaccines, screening tests, monitoring & exams) can help personalize your care plan, which helps you manage your own care. Screening tests can find health problems at the earliest stages, when they are easiest to treat. [facility] follows the current, evidence-based guidelines published by the [facility] Ricky Bishop ([facility]) when recommending preventive services for our patients. Because we follow these guidelines, sometimes recommendations change over time as research supports it. (For example, a prostate screening blood test is no longer routinely recommended for men with no symptoms). Of course, you and your doctor may decide to screen more often for some diseases, based on your risk and co-morbidities (chronic disease you are already diagnosed with). Preventive services for you include:  - Medicare offers their members a free annual wellness visit, which is time for you and your primary care provider to discuss and plan for your preventive service needs. Take advantage of this benefit every year!  -All adults over age 72 should receive the recommended pneumonia vaccines. Current USPSTF guidelines recommend a series of two vaccines for the best pneumonia protection.   -All adults should have a flu vaccine yearly and tetanus vaccine every 10 years.  -All adults age 48 and older should receive the shingles vaccines (series of two vaccines).        -All adults age 38-68 who are overweight should have a diabetes screening test once every three years.   -Other screening tests & preventive services for persons with diabetes include: an eye exam to screen for diabetic retinopathy, a kidney function test, a foot exam, and stricter control over your cholesterol.   -Cardiovascular screening for adults with routine risk involves an electrocardiogram (ECG) at intervals determined by the provider.   -Colorectal cancer screening should be done for adults age 54-65 with no increased risk factors for colorectal cancer. There are a number of acceptable methods of screening for this type of cancer. Each test has its own benefits and drawbacks. Discuss with your provider what is most appropriate for you during your annual wellness visit. The different tests include: colonoscopy (considered the best screening method), a fecal occult blood test, a fecal DNA test, and sigmoidoscopy.  -All adults born between [facility] should be screened once for Hepatitis C.  -An Abdominal Aortic Aneurysm (AAA) Screening is recommended for men age 73-68 who has ever smoked in their lifetime. Here is a list of your current Health Maintenance items (your personalized list of preventive services) with a due date:  Health Maintenance Due   Topic Date Due    DTaP/Tdap/Td  (1 - Tdap) Never done    Colorectal Screening  Never done    COVID-19 Vaccine (3 - Booster for Moderna series) 09/04/2021    Yearly Flu Vaccine (1) 08/01/2022    Depresssion Screening  09/23/2022    Cholesterol Test   09/28/2022   Vaccine Information Statement    Influenza (Flu) Vaccine (Inactivated or Recombinant): What You Need to Know    Many vaccine information statements are available in [place] and other languages. See www.immunize.org/vis. Hojas de información sobre vacunas están disponibles en español y en muchos otros idiomas. Visite www.immunize.org/vis. 1. Why get vaccinated? Influenza vaccine can prevent influenza (flu). Flu is a contagious disease that spreads around the [place] every year, usually between October and May. Anyone can get the flu, but it is more dangerous for some people.  Infants and young children, people 72 years and older, pregnant people, and people with certain health conditions or a weakened immune system are at greatest risk of flu complications. Pneumonia, bronchitis, sinus infections, and ear infections are examples of flu-related complications. If you have a medical condition, such as heart disease, cancer, or diabetes, flu can make it worse. Flu can cause fever and chills, sore throat, muscle aches, fatigue, cough, headache, and runny or stuffy nose. Some people may have vomiting and diarrhea, though this is more common in children than adults. In an average year, thousands of people in the [facility] die from flu, and many more are hospitalized. Flu vaccine prevents millions of illnesses and flu-related visits to the doctor each year. 2. Influenza vaccines     CDC recommends everyone 6 months and older get vaccinated every flu season. Children 6 months through 6years of age may need 2 doses during a single flu season. Everyone else needs only 1 dose each flu season. It takes about 2 weeks for protection to develop after vaccination. There are many flu viruses, and they are always changing. Each year a new flu vaccine is made to protect against the influenza viruses believed to be likely to cause disease in the upcoming flu season. Even when the vaccine doesnt exactly match these viruses, it may still provide some protection. Influenza vaccine does not cause flu. Influenza vaccine may be given at the same time as other vaccines. 3. Talk with your health care provider    Tell your vaccination provider if the person getting the vaccine:  Has had an allergic reaction after a previous dose of influenza vaccine, or has any severe, life-threatening allergies   Has ever had Guillain-Barré Syndrome (also called GBS)    In some cases, your health care provider may decide to postpone influenza vaccination until a future visit.     Influenza vaccine can be administered at any time during pregnancy. People who are or will be pregnant during influenza season should receive inactivated influenza vaccine. People with minor illnesses, such as a cold, may be vaccinated. People who are moderately or severely ill should usually wait until they recover before getting influenza vaccine. Your health care provider can give you more information. 4. Risks of a vaccine reaction    Soreness, redness, and swelling where the shot is given, fever, muscle aches, and headache can happen after influenza vaccination. There may be a very small increased risk of Guillain-Barré Syndrome (GBS) after inactivated influenza vaccine (the flu shot). Nanette Pereira children who get the flu shot along with pneumococcal vaccine (PCV13) and/or DTaP vaccine at the same time might be slightly more likely to have a seizure caused by fever. Tell your health care provider if a child who is getting flu vaccine has ever had a seizure. People sometimes faint after medical procedures, including vaccination. Tell your provider if you feel dizzy or have vision changes or ringing in the ears. As with any medicine, there is a very remote chance of a vaccine causing a severe allergic reaction, other serious injury, or death. 5. What if there is a serious problem? An allergic reaction could occur after the vaccinated person leaves the clinic. If you see signs of a severe allergic reaction (hives, swelling of the face and throat, difficulty breathing, a fast heartbeat, dizziness, or weakness), call 9-1-1 and get the person to the nearest hospital.    For other signs that concern you, call your health care provider. Adverse reactions should be reported to the Vaccine Adverse Event Reporting System (VAERS). Your health care provider will usually file this report, or you can do it yourself. Visit the VAERS website at www.vaers. hhs.gov or call 5-677.361.2774.  VAERS is only for reporting reactions, and [facility] staff members do not give medical advice. 6. The National Vaccine Injury Compensation Program    The [facility] Vaccine Injury Compensation Program (VICP) is a federal program that was created to compensate people who may have been injured by certain vaccines. Claims regarding alleged injury or death due to vaccination have a time limit for filing, which may be as short as two years. Visit the VICP website at www.[facility].gov/vaccinecompensation or call 2-441.284.6289 to learn about the program and about filing a claim. 7. How can I learn more? Ask your health care provider. Call your local or state health department. Visit the website of the Food and Drug Administration (FDA) for vaccine package inserts and additional information at www.fda.gov/vaccines-blood-biologics/vaccines. Contact the Centers for Disease Control and Prevention (CDC): Call 8-372.990.9334 (1-800-CDC-INFO) or  Visit CDCs influenza website at www.cdc.gov/flu. Vaccine Information Statement   Inactivated Influenza Vaccine   8/6/2021  42 MERI Delacruz [id number]     Department of Health and Human Services  Centers for Disease Control and Prevention    Office Use Only

## 2022-09-26 NOTE — PROGRESS NOTES
Davy Torres  is a 69 y.o.  male  who present for routine immunizations. Prior to vaccine administration: Consent was obtained. Risks and adverse reactions were discussed. The patient was provided the VIS and they were given an opportunity to ask questions; all questions were addressed. He  denies any symptoms, reactions or allergies that would exclude them from being immunized today. There were no adverse reactions observed post vaccination. Patient was advised to seek medical or call the office with any questions or concerns post vaccination. Patient verbalized understanding.   Bobbi Rodrigues LPN

## 2022-10-13 ENCOUNTER — HOSPITAL ENCOUNTER (OUTPATIENT)
Dept: PREADMISSION TESTING | Age: 67
Discharge: HOME | End: 2022-10-13

## 2022-10-13 VITALS
RESPIRATION RATE: 18 BRPM | HEIGHT: 67 IN | WEIGHT: 187.83 LBS | SYSTOLIC BLOOD PRESSURE: 123 MMHG | HEART RATE: 78 BPM | TEMPERATURE: 98.3 F | BODY MASS INDEX: 29.48 KG/M2 | DIASTOLIC BLOOD PRESSURE: 83 MMHG

## 2022-10-13 NOTE — PERIOP NOTES
[address] [facility] INSTRUCTIONS  ORTHOPAEDIC    Surgery Date:   10/20/2022    Your surgeon's office or [facility] staff will call you between 4 PM- 8 PM the day before surgery with your arrival time. If your surgery is on a Monday, you will receive a call the preceding Friday. Please report to [facility] Patient Access/Admitting on the 1st floor. Bring your insurance card, photo identification, and any copayment (if applicable). If you are going home the same day of your surgery, you must have a responsible adult to drive you home. You need to have a responsible adult to stay with you the first 24 hours after surgery and you should not drive a car for 24 hours following your surgery. Do NOT eat any solid foods after midnight the night before surgery including candy, mints or gum. You may drink clear liquids from midnight until 1 hour prior to arrival time. You may drink up to 12 ounces at one time every 4 hours. Do NOT drink alcohol or smoke 24 hours before surgery. STOP smoking for 14 days prior as it helps with breathing and healing after surgery. If your arrival time is 3pm or later, you may eat a light breakfast before 8am (toast, bagel-no butter, black coffee, plain tea, fruit juice-no pulp) Please note special instructions, if applicable, below for medications. If you are being admitted to the hospital,please leave personal belongings/luggage in your car until you have an assigned hospital room number. Please wear comfortable clothes. Wear your glasses instead of contacts. We ask that all money, jewelry and valuables be left at home. Wear no make up, particularly mascara, the day of surgery. All body piercings, rings, and jewelry need to be removed and left at home. Please remove any nail polish or artificial nails from your fingernails. Please wear your hair loose or down. Please no pony-tails, buns, or any metal hair accessories.  If you shower the morning of surgery, please do not apply any lotions or powders afterwards. You may wear deodorant. Do not shave any body area within 24 hours of your surgery. Please follow all instructions to avoid any potential surgical cancellation. Should your physical condition change, (i.e. fever, cold, flu, etc.) please notify your surgeon as soon as possible. It is important to be on time. If a situation occurs where you may be delayed, please call:  [phone] / [phone] on the day of surgery. The Preadmission Testing staff can be reached at [phone]. Special instructions: NONE    Current Outpatient Medications   Medication Sig    amoxicillin-clavulanate (AUGMENTIN) 500-125 mg per tablet Take 1 Tablet by mouth two (2) times a day. triamcinolone acetonide (KENALOG) 0.1 % topical cream Apply  to affected area two (2) times a day. use thin layer    lisinopril (PRINIVIL, ZESTRIL) 40 mg tablet     meloxicam (MOBIC) 15 mg tablet TAKE 1 TABLET BY MOUTH ONCE DAILY AFTER FOOD    rosuvastatin (CRESTOR) 10 mg tablet Take 10 mg by mouth daily. tadalafil (CIALIS) 5 mg tablet TAKE 1 TABLET BY MOUTH ONCE DAILY FOR BPH. No current facility-administered medications for this encounter. YOU MUST ONLY TAKE THESE MEDICATIONS THE MORNING OF SURGERY WITH A SIP OF WATER: ROSUVASTATIN,  AUGMENTIN,     MEDICATIONS TO TAKE THE MORNING OF SURGERY ONLY IF NEEDED: TYLENOL    HOLD these prescription medications BEFORE Surgery: ** DO NOT TAKE LISINOPRIL MORNING OF SURGERY ** , MELOXICAM   STOP TAKING ON 10/19/2022: CIALIS     Ask your surgeon/prescribing physician about when/if to STOP taking these medications: NONE    Stop any non-steroidal anti-inflammatory drugs (i.e. Ibuprofen, Naproxen, Advil, Aleve) 3 days before surgery. You may take Tylenol. STOP all vitamins and herbal supplements 1 week prior to  surgery.     If you are currently taking Plavix, Coumadin, or any other blood-thinning/anticoagulant medication contact your prescribing physician for instructions. Preventing Infections Before and After - Your Surgery    IMPORTANT INSTRUCTIONS    You play an important role in your health and preparation for surgery. To reduce the germs on your skin you will need to shower with CHG soap (Chorhexidine gluconate 4%) two times before surgery. CHG soap (Hibiclens, Hex-A-Clens or store brand)  CHG soap will be provided at your Preadmission Testing (PAT) appointment. If you do not have a PAT appointment before surgery, you may arrange to  CHG soap from our office or purchase CHG soap at a pharmacy, grocery or department store. You need to purchase TWO 4 ounce bottles to use for your 2 showers. Steps to follow:  Daniel Climax your hair with your normal shampoo and your body with regular soap and rinse well to remove shampoo and soap from your skin. Wet a clean washcloth and turn off the shower. Put CHG soap on washcloth and apply to your entire body from the neck down. Do not use on your head, face or private parts(genitals). Do not use CHG soap on open sores, wounds or areas of skin irritation. Wash you body gently for 5 minutes. Do not wash your skin too hard. This soap does not create lather. Pay special attention to your underarms and from your belly button to your feet. Turn the shower back on and rinse well to get CHG soap off your body. Pat your skin dry with a clean, dry towel. Do not apply lotions or moisturizer. Put on clean clothes and sleep on fresh bed sheets and do not allow pets to sleep with you. Shower with CHG soap 2 times before your surgery  The evening before your surgery  The morning of your surgery      Tips to help prevent infections after your surgery:  Protect your surgical wound from germs:  Hand washing is the most important thing you and your caregivers can do to prevent infections. Keep your bandage clean and dry! Do not touch your surgical wound.   Use clean, freshly washed towels and washcloths every time you shower; do not share bath linens with others. Until your surgical wound is healed, wear clothing and sleep on bed linens each day that are clean and freshly washed. Do not allow pets to sleep in your bed with you or touch your surgical wound. Do not smoke - smoking delays wound healing. This may be a good time to stop smoking. If you have diabetes, it is important for you to manage your blood sugar levels properly before your surgery as well as after your surgery. Poorly managed blood sugar levels slow down wound healing and prevent you from healing completely. Prevention of Infection  Testing for Staphylococcus aureus on your skin before surgery    Staphylococcus aureus (staph) is a common bacteria that is found on the body. It normally does not cause infection on healthy skin. Before surgery, you will be tested to see if you have staph by swabbing the inside of your nose. When you have an incision with surgery, the goal is to protect that incision from infection. Removal of the staph bacteria before surgery can decrease the risk of a surgical site infection. If your nose swab is positive for staph you will be called. Your treatment will include 2 steps:  Prescription for Mupirocin ointment to be used in each nostril twice a day for 5 days. Showering with Chlorhexidine (CHG) liquid soap for 5 days prior to surgery. How to use Mupirocin ointment in your nose   the prescription from your pharmacy. You will receive a large tube of ointment which will be big enough for all of your treatments. You will apply this ointment to each nostril 2 times a day for 5 days. Wash your hands with  gel or soap and water for 20 seconds before using ointment. Place a pea-sized amount of ointment on a cotton Q-tip. Apply ointment just inside of each nostril with the Q-tip. Do not push Q-tip or ointment deep inside you nose. Press your nostrils together and massage for a few seconds.   Wash your hands with  gel or soap and water after you are finished. Do not get ointment near your eyes. If it gets into your eyes, rinse them with cool water. If you need to use nasal spray, clean the tip of the bottle with alcohol before use and do not use both at the same time. If you are scheduled for COVID testing during the 5 days, do NOT apply morning dose until after the COVID test has been performed. How to use Chlorhexidine (CHG) 4% liquid soap  Purchase an 8 ounce bottle of CHG liquid soap (Chlorhexidine 4%, Hibiclens, Hex-A-Clens or store brand) at a pharmacy or grocery store. Wash your hair with your normal shampoo and your body with regular soap and rinse well to remove shampoo and soap from your skin. Wet a clean washcloth and turn off the shower. Put CHG soap on washcloth and apply to your entire body from the neck down. Do not use on your head, face or private parts(genitals). Do not use CHG soap on open sores, wounds or areas of skin irritation. Wash your body gently for 5 minutes. Do not wash your skin too hard. This soap does not create lather. Pay special attention to your underarms and from your belly button to your feet. Turn the shower back on and rinse well to get CHG soap off your body. Pat your skin dry with a clean, dry towel. Do not apply lotions or moisturizer. Put on clean clothes and sleep on fresh bed sheets the night before surgery. Do not allow pets to sleep with you. Eating and Drinking Before Surgery    You may eat a regular dinner at the usual time on the day before your surgery. Do NOT eat any solid foods after midnight unless your arrival time at the hospital is 3pm or later. You may drink clear liquids only from 12 midnight until 1 hours prior to your arrival time at the hospital on the day of your surgery. Do NOT drink alcohol.   Clear liquids include:  Water  Fruit juices without pulp( i.e. apple juice)  Carbonated beverages  Black coffee (no cream/milk)  Tea (no cream/milk)  Gatorade  You may drink up to 12-16 ounces at one time every 4 hours between the hours of midnight and 1 hour before your arrival time at the hospital. Example- if your arrival time at the hospital is 6am, you may drink 12-16 ounces of clear liquids no later than 5am.  If your arrival time at the hospital is 3pm or later, you may eat a light breakfast before 8am.  A light breakfast includes: Toast or bagel (no butter)  Black coffee (no cream/milk)  Tea (no cream/milk)  Fruit juices without pulp ( i.e. apple juice)  Do NOT eat meat, eggs, vegetables or fruit  If you have any questions, please contact your surgeon's office. Patient Information Regarding COVID Restrictions    Day of Procedure    Please park in the parking deck or any designated visitor parking lot. Enter the facility through the Main Entrance of the hospital.  On the day of surgery, please provide the cell phone number of the person who will be waiting for you to the Patient Access representative at the time of registration. Masks are highly recommended in the hospital, but not required. Once your procedure and the immediate recovery period is completed, a nurse in the recovery area will contact your designated visitor to inform them of your room number or to otherwise review other pertinent information regarding your care. Social distancing practices are strongly encouraged in waiting areas and the cafeteria. The patient was contacted in person. He verbalized understanding of all instructions does not  need reinforcement.

## 2022-10-13 NOTE — PERIOP NOTES
INSTRUCTIONS GIVEN AND REVIEWED, 2 BOTTLES OF SOAP GIVEN, PT GIVEN TIME TO ASK QUESTIONS     CALLED DR. YAJAIRA ORTIZ-CARDIO -[phone] FOR LOV NOTES AND YURY RECENT TESTING SPOKE WITH SIMON,     COPY OF COVID CARD PLACED ON CHART     RECEIVED LOV NOTES AND EKG, CALLING CARDIOLOGY BACK DUE TO NEEDING 2 IDENTIFIERS ON THE EKG Progress Note - Sleep Center   Wayne Albert XEX:[birth year] MRN: [id number]      Reason for Visit:  59 y  o female here for annual follow-up    Assessment:  Doing well on current therapy of CPAP 14 cm for AHI=9  She is complaining of mask leak, and I will try her on Lopze FX nasal pillows  Plan:  Continue same    Follow up: One year    History of Present Illness:  History of ALEC on PAP therapy  Fully compliant and deriving benefit  Review of Systems      Genitourinary need to urinate more than twice a night, hot flashes at night and post menopausal (no peroids)   Cardiology none   Gastrointestinal frequent heartburn/acid reflux and abdominal pain or cramping that disturb sleep    Neurology frequent headaches, awaken with headache, need to move extremities, forgetfulness, poor concentration or confusion,  and difficulty with memory   Constitutional excessive sweating at night and weight change   Integumentary none   Psychiatry anxiety and depression   Musculoskeletal back pain and leg cramps   Pulmonary snoring   ENT throat clearing and ringing in ears   Endocrine excessive thirst and frequent urination   Hematological blood donor         Historical Information    Past Medical History:   Past Medical History:   Diagnosis Date    Abnormal mammogram     RESOLVED: [id number]    Anxiety     Anxiety     Arthritis     Depression     Depression     Diverticulosis     GERD (gastroesophageal reflux disease)     Helicobacter pylori infection     Hyperlipidemia     Seborrheic keratosis     LAST ASSESSED: [id number]    Sleep apnea     Sleep apnea          Past Surgical History:   Past Surgical History:   Procedure Laterality Date    ABDOMINOPLASTY      abdomin    [place] COLONOSCOPY FLX DX W/COLLJ SPEC WHEN PFRMD N/A 8/11/2016    Procedure: COLONOSCOPY;  Surgeon: Ry Garcia MD;  Location: [facility];   Service: Gastroenterology    [place] COLONOSCOPY FLX DX W/COLLJ [facility] WHEN PFRMD N/A 8/29/2017    Procedure: EGD AND COLONOSCOPY;  Surgeon: Bradford Montiel MD;  Location: [facility]; Service: Gastroenterology    TUBAL LIGATION      TUBAL LIGATION         Social History:   Social History     Social History    Marital status: Single     Spouse name: N/A    Number of children: N/A    Years of education: N/A     Social History Main Topics    Smoking status: Never Smoker    Smokeless tobacco: Never Used    Alcohol use No    Drug use: No    Sexual activity: Yes     Partners: Male     Birth control/ protection: None, Post-menopausal     Other Topics Concern    None     Social History Narrative    None       Family History:   Family History   Problem Relation Age of Onset    Diabetes Mother     Hypertension Mother     Heart disease Mother     Stomach cancer Paternal Grandfather        Medications/Allergies:      Current Outpatient Prescriptions:     ACCU-CHEK FASTCLIX LANCETS MISC, Check sugars every few days, Disp: 102 each, Rfl: 3    ACCU-CHEK GUIDE test strip, TEST every morning, Disp: 50 each, Rfl: 4    acetaminophen (TYLENOL) 500 mg tablet, Take 1 tablet (500 mg total) by mouth daily as needed for headaches, Disp: 30 tablet, Rfl: 3    calcium carbonate (TUMS ULTRA) 1000 MG chewable tablet, Chew 1 tablet (1,000 mg total) every 6 (six) hours as needed for indigestion or heartburn, Disp: 120 tablet, Rfl: 1    clonazePAM (KlonoPIN) 1 mg tablet, Take 1 mg by mouth daily at bedtime as needed for anxiety    , Disp: , Rfl:     cyclobenzaprine (FLEXERIL) 5 mg tablet, Take 1 tablet by mouth 3 (three) times a day as needed for muscle spasms, Disp: 10 tablet, Rfl: 0    dicyclomine (BENTYL) 10 mg capsule, Take 1 capsule (10 mg total) by mouth 4 (four) times a day as needed (abdominal pain), Disp: 30 capsule, Rfl: 2    glucose blood (ACCU-CHEK GUIDE) test strip, Check sugars every few days, Disp: 100 each, Rfl: 3    ibuprofen (MOTRIN) 400 mg tablet, Take 400 mg by mouth 3 (three) times a day as needed for mild pain, Disp: , Rfl:     omeprazole (PriLOSEC) 20 mg delayed release capsule, take 1 capsule by mouth once daily, Disp: 90 capsule, Rfl: 3    PARoxetine (PAXIL) 40 MG tablet, Take 40 mg by mouth every morning , Disp: , Rfl:     polyethylene glycol (GAVILYTE-G) 4000 mL solution, Take by mouth once, Disp: , Rfl:     Riboflavin 100 MG TABS, Take 1 tablet (100 mg total) by mouth daily, Disp: 90 tablet, Rfl: 1    risperiDONE (RisperDAL) 2 mg tablet, Take 2 mg by mouth 2 (two) times a day , Disp: , Rfl:     Saline 0 65 % SOLN, 0 65 % into each nostril daily, Disp: , Rfl:     venlafaxine (EFFEXOR) 100 MG tablet, Take 150 mg by mouth, Disp: , Rfl:           Objective      Vital Signs:   Vitals:    02/08/19 0900   BP: 120/76   Pulse: 68     [place] Sleepiness Scale: Total score: 2        Physical Exam:    General: Alert, appropriate, cooperative, overweight    Head: NC/AT    Skin: Warm, dry    Neuro: No motor abnormalities, cranial nerves appear intact    Extremity: No clubbing, cyanosis      DME Provider: LANE    I have reviewed and updated the review of systems as necessary    Counseling / Coordination of Care   I have spent 20 minutes with Patient  today in which greater than 50% of this time was spent in counseling/coordination of care regarding Importance of tx compliance                Board Certified Sleep Specialist

## 2022-10-19 ENCOUNTER — ANESTHESIA EVENT (OUTPATIENT)
Dept: MEDSURG UNIT | Age: 67
End: 2022-10-19
Payer: MEDICARE

## 2022-10-20 ENCOUNTER — HOSPITAL ENCOUNTER (OUTPATIENT)
Age: 67
Setting detail: OUTPATIENT SURGERY
Discharge: HOME | End: 2022-10-20
Attending: ORTHOPAEDIC SURGERY | Admitting: ORTHOPAEDIC SURGERY
Payer: MEDICARE

## 2022-10-20 ENCOUNTER — ANESTHESIA (OUTPATIENT)
Dept: MEDSURG UNIT | Age: 67
End: 2022-10-20
Payer: MEDICARE

## 2022-10-20 VITALS
HEART RATE: 81 BPM | OXYGEN SATURATION: 93 % | DIASTOLIC BLOOD PRESSURE: 90 MMHG | BODY MASS INDEX: 29.48 KG/M2 | WEIGHT: 187.83 LBS | HEIGHT: 67 IN | SYSTOLIC BLOOD PRESSURE: 123 MMHG | RESPIRATION RATE: 18 BRPM | TEMPERATURE: 97 F

## 2022-10-20 DIAGNOSIS — M75.122 COMPLETE TEAR OF LEFT ROTATOR CUFF, UNSPECIFIED WHETHER TRAUMATIC: Primary | ICD-10-CM

## 2022-10-20 PROCEDURE — 74011000250 HC RX REV CODE- 250: Performed by: NURSE ANESTHETIST, CERTIFIED REGISTERED

## 2022-10-20 PROCEDURE — 77030011390 HC GRSP SUT IDL J&J -C: Performed by: ORTHOPAEDIC SURGERY

## 2022-10-20 PROCEDURE — 77030002933 HC SUT MCRYL J&J -A: Performed by: ORTHOPAEDIC SURGERY

## 2022-10-20 PROCEDURE — 77030040361 HC SLV COMPR DVT MDII -B: Performed by: ORTHOPAEDIC SURGERY

## 2022-10-20 PROCEDURE — 77030026438 HC STYL ET INTUB CARD -A: Performed by: ANESTHESIOLOGY

## 2022-10-20 PROCEDURE — 77030002916 HC SUT ETHLN J&J -A: Performed by: ORTHOPAEDIC SURGERY

## 2022-10-20 PROCEDURE — 77030003601 HC NDL NRV BLK BBMI -A

## 2022-10-20 PROCEDURE — 74011250636 HC RX REV CODE- 250/636: Performed by: ANESTHESIOLOGY

## 2022-10-20 PROCEDURE — 74011250636 HC RX REV CODE- 250/636: Performed by: NURSE ANESTHETIST, CERTIFIED REGISTERED

## 2022-10-20 PROCEDURE — 74011250636 HC RX REV CODE- 250/636: Performed by: PHYSICIAN ASSISTANT

## 2022-10-20 PROCEDURE — 77030040922 HC BLNKT HYPOTHRM STRY -A

## 2022-10-20 PROCEDURE — 76210000036 HC AMBSU PH I REC 1.5 TO 2 HR: Performed by: ORTHOPAEDIC SURGERY

## 2022-10-20 PROCEDURE — 74011250636 HC RX REV CODE- 250/636: Performed by: ORTHOPAEDIC SURGERY

## 2022-10-20 PROCEDURE — 2709999900 HC NON-CHARGEABLE SUPPLY: Performed by: ORTHOPAEDIC SURGERY

## 2022-10-20 PROCEDURE — C1713 ANCHOR/SCREW BN/BN,TIS/BN: HCPCS | Performed by: ORTHOPAEDIC SURGERY

## 2022-10-20 PROCEDURE — 77030016678 HC BUR SHV4 S&N -B: Performed by: ORTHOPAEDIC SURGERY

## 2022-10-20 PROCEDURE — 76030000003 HC AMB SURG OR TIME 1.5 TO 2: Performed by: ORTHOPAEDIC SURGERY

## 2022-10-20 PROCEDURE — 77030008684 HC TU ET CUF COVD -B: Performed by: ANESTHESIOLOGY

## 2022-10-20 PROCEDURE — 2709999900 HC NON-CHARGEABLE SUPPLY

## 2022-10-20 PROCEDURE — 74011000250 HC RX REV CODE- 250: Performed by: PHYSICIAN ASSISTANT

## 2022-10-20 PROCEDURE — 77030006884 HC BLD SHV INCIS S&N -B: Performed by: ORTHOPAEDIC SURGERY

## 2022-10-20 PROCEDURE — 76060000063 HC AMB SURG ANES 1.5 TO 2 HR: Performed by: ORTHOPAEDIC SURGERY

## 2022-10-20 PROCEDURE — 77030018834: Performed by: ORTHOPAEDIC SURGERY

## 2022-10-20 PROCEDURE — 77030002922 HC SUT FBRWRE ARTH -B: Performed by: ORTHOPAEDIC SURGERY

## 2022-10-20 PROCEDURE — 77030006988: Performed by: ORTHOPAEDIC SURGERY

## 2022-10-20 DEVICE — IMPLANTABLE DEVICE: Type: IMPLANTABLE DEVICE | Site: SHOULDER | Status: FUNCTIONAL

## 2022-10-20 RX ORDER — SODIUM CHLORIDE, SODIUM LACTATE, POTASSIUM CHLORIDE, CALCIUM CHLORIDE 600; 310; 30; 20 MG/100ML; MG/100ML; MG/100ML; MG/100ML
125 INJECTION, SOLUTION INTRAVENOUS CONTINUOUS
Status: DISCONTINUED | OUTPATIENT
Start: 2022-10-20 | End: 2022-10-20 | Stop reason: HOSPADM

## 2022-10-20 RX ORDER — FENTANYL CITRATE 50 UG/ML
50 INJECTION, SOLUTION INTRAMUSCULAR; INTRAVENOUS ONCE
Status: DISCONTINUED | OUTPATIENT
Start: 2022-10-20 | End: 2022-10-20 | Stop reason: HOSPADM

## 2022-10-20 RX ORDER — SODIUM CHLORIDE 0.9 % (FLUSH) 0.9 %
5-40 SYRINGE (ML) INJECTION AS NEEDED
Status: DISCONTINUED | OUTPATIENT
Start: 2022-10-20 | End: 2022-10-20 | Stop reason: HOSPADM

## 2022-10-20 RX ORDER — ONDANSETRON 2 MG/ML
4 INJECTION INTRAMUSCULAR; INTRAVENOUS AS NEEDED
Status: DISCONTINUED | OUTPATIENT
Start: 2022-10-20 | End: 2022-10-20 | Stop reason: HOSPADM

## 2022-10-20 RX ORDER — OXYCODONE AND ACETAMINOPHEN 5; 325 MG/1; MG/1
1 TABLET ORAL
Qty: 30 TABLET | Refills: 0 | Status: SHIPPED | OUTPATIENT
Start: 2022-10-20 | End: 2022-11-03

## 2022-10-20 RX ORDER — SUCCINYLCHOLINE CHLORIDE 20 MG/ML
INJECTION INTRAMUSCULAR; INTRAVENOUS AS NEEDED
Status: DISCONTINUED | OUTPATIENT
Start: 2022-10-20 | End: 2022-10-20 | Stop reason: HOSPADM

## 2022-10-20 RX ORDER — PROPOFOL 10 MG/ML
INJECTION, EMULSION INTRAVENOUS AS NEEDED
Status: DISCONTINUED | OUTPATIENT
Start: 2022-10-20 | End: 2022-10-20 | Stop reason: HOSPADM

## 2022-10-20 RX ORDER — ROCURONIUM BROMIDE 10 MG/ML
INJECTION, SOLUTION INTRAVENOUS AS NEEDED
Status: DISCONTINUED | OUTPATIENT
Start: 2022-10-20 | End: 2022-10-20 | Stop reason: HOSPADM

## 2022-10-20 RX ORDER — SODIUM CHLORIDE 0.9 % (FLUSH) 0.9 %
5-40 SYRINGE (ML) INJECTION EVERY 8 HOURS
Status: DISCONTINUED | OUTPATIENT
Start: 2022-10-20 | End: 2022-10-20 | Stop reason: HOSPADM

## 2022-10-20 RX ORDER — ONDANSETRON 2 MG/ML
INJECTION INTRAMUSCULAR; INTRAVENOUS AS NEEDED
Status: DISCONTINUED | OUTPATIENT
Start: 2022-10-20 | End: 2022-10-20 | Stop reason: HOSPADM

## 2022-10-20 RX ORDER — FENTANYL CITRATE 50 UG/ML
INJECTION, SOLUTION INTRAMUSCULAR; INTRAVENOUS AS NEEDED
Status: DISCONTINUED | OUTPATIENT
Start: 2022-10-20 | End: 2022-10-20 | Stop reason: HOSPADM

## 2022-10-20 RX ORDER — LIDOCAINE HYDROCHLORIDE 20 MG/ML
INJECTION, SOLUTION EPIDURAL; INFILTRATION; INTRACAUDAL; PERINEURAL AS NEEDED
Status: DISCONTINUED | OUTPATIENT
Start: 2022-10-20 | End: 2022-10-20 | Stop reason: HOSPADM

## 2022-10-20 RX ORDER — SODIUM CHLORIDE 9 MG/ML
INJECTION, SOLUTION INTRAVENOUS
Status: DISCONTINUED | OUTPATIENT
Start: 2022-10-20 | End: 2022-10-20 | Stop reason: HOSPADM

## 2022-10-20 RX ORDER — OXYCODONE HYDROCHLORIDE 5 MG/1
5 TABLET ORAL AS NEEDED
Status: DISCONTINUED | OUTPATIENT
Start: 2022-10-20 | End: 2022-10-20 | Stop reason: HOSPADM

## 2022-10-20 RX ORDER — ROPIVACAINE HYDROCHLORIDE 5 MG/ML
30 INJECTION, SOLUTION EPIDURAL; INFILTRATION; PERINEURAL AS NEEDED
Status: COMPLETED | OUTPATIENT
Start: 2022-10-20 | End: 2022-10-20

## 2022-10-20 RX ORDER — ACETAMINOPHEN 325 MG/1
650 TABLET ORAL ONCE
Status: DISCONTINUED | OUTPATIENT
Start: 2022-10-20 | End: 2022-10-20 | Stop reason: HOSPADM

## 2022-10-20 RX ORDER — DEXAMETHASONE SODIUM PHOSPHATE 4 MG/ML
INJECTION, SOLUTION INTRA-ARTICULAR; INTRALESIONAL; INTRAMUSCULAR; INTRAVENOUS; SOFT TISSUE AS NEEDED
Status: DISCONTINUED | OUTPATIENT
Start: 2022-10-20 | End: 2022-10-20 | Stop reason: HOSPADM

## 2022-10-20 RX ORDER — MIDAZOLAM HYDROCHLORIDE 1 MG/ML
1 INJECTION, SOLUTION INTRAMUSCULAR; INTRAVENOUS
Status: DISCONTINUED | OUTPATIENT
Start: 2022-10-20 | End: 2022-10-20 | Stop reason: HOSPADM

## 2022-10-20 RX ORDER — MIDAZOLAM HYDROCHLORIDE 1 MG/ML
2 INJECTION, SOLUTION INTRAMUSCULAR; INTRAVENOUS ONCE
Status: DISCONTINUED | OUTPATIENT
Start: 2022-10-20 | End: 2022-10-20 | Stop reason: HOSPADM

## 2022-10-20 RX ORDER — FENTANYL CITRATE 50 UG/ML
25 INJECTION, SOLUTION INTRAMUSCULAR; INTRAVENOUS
Status: DISCONTINUED | OUTPATIENT
Start: 2022-10-20 | End: 2022-10-20 | Stop reason: HOSPADM

## 2022-10-20 RX ORDER — MIDAZOLAM HYDROCHLORIDE 1 MG/ML
1 INJECTION, SOLUTION INTRAMUSCULAR; INTRAVENOUS AS NEEDED
Status: DISCONTINUED | OUTPATIENT
Start: 2022-10-20 | End: 2022-10-20 | Stop reason: HOSPADM

## 2022-10-20 RX ORDER — DEXTROSE, SODIUM CHLORIDE, SODIUM LACTATE, POTASSIUM CHLORIDE, AND CALCIUM CHLORIDE 5; .6; .31; .03; .02 G/100ML; G/100ML; G/100ML; G/100ML; G/100ML
125 INJECTION, SOLUTION INTRAVENOUS CONTINUOUS
Status: DISCONTINUED | OUTPATIENT
Start: 2022-10-20 | End: 2022-10-20 | Stop reason: HOSPADM

## 2022-10-20 RX ORDER — DIPHENHYDRAMINE HYDROCHLORIDE 50 MG/ML
12.5 INJECTION, SOLUTION INTRAMUSCULAR; INTRAVENOUS AS NEEDED
Status: DISCONTINUED | OUTPATIENT
Start: 2022-10-20 | End: 2022-10-20 | Stop reason: HOSPADM

## 2022-10-20 RX ORDER — MORPHINE SULFATE 2 MG/ML
2 INJECTION, SOLUTION INTRAMUSCULAR; INTRAVENOUS
Status: DISCONTINUED | OUTPATIENT
Start: 2022-10-20 | End: 2022-10-20 | Stop reason: HOSPADM

## 2022-10-20 RX ORDER — FENTANYL CITRATE 50 UG/ML
50 INJECTION, SOLUTION INTRAMUSCULAR; INTRAVENOUS AS NEEDED
Status: DISCONTINUED | OUTPATIENT
Start: 2022-10-20 | End: 2022-10-20 | Stop reason: HOSPADM

## 2022-10-20 RX ORDER — LIDOCAINE HYDROCHLORIDE 10 MG/ML
0.5 INJECTION, SOLUTION EPIDURAL; INFILTRATION; INTRACAUDAL; PERINEURAL AS NEEDED
Status: DISCONTINUED | OUTPATIENT
Start: 2022-10-20 | End: 2022-10-20 | Stop reason: HOSPADM

## 2022-10-20 RX ORDER — EPHEDRINE SULFATE/0.9% NACL/PF 50 MG/5 ML
SYRINGE (ML) INTRAVENOUS AS NEEDED
Status: DISCONTINUED | OUTPATIENT
Start: 2022-10-20 | End: 2022-10-20 | Stop reason: HOSPADM

## 2022-10-20 RX ADMIN — PROPOFOL 100 MG: 10 INJECTION, EMULSION INTRAVENOUS at 08:52

## 2022-10-20 RX ADMIN — FENTANYL CITRATE 50 MCG: 50 INJECTION, SOLUTION INTRAMUSCULAR; INTRAVENOUS at 07:46

## 2022-10-20 RX ADMIN — LIDOCAINE HYDROCHLORIDE 80 MG: 20 INJECTION, SOLUTION EPIDURAL; INFILTRATION; INTRACAUDAL; PERINEURAL at 08:09

## 2022-10-20 RX ADMIN — MIDAZOLAM HYDROCHLORIDE 2 MG: 1 INJECTION, SOLUTION INTRAMUSCULAR; INTRAVENOUS at 07:46

## 2022-10-20 RX ADMIN — PROPOFOL 100 MG: 10 INJECTION, EMULSION INTRAVENOUS at 08:47

## 2022-10-20 RX ADMIN — ROCURONIUM BROMIDE 5 MG: 10 SOLUTION INTRAVENOUS at 08:09

## 2022-10-20 RX ADMIN — PROPOFOL 150 MG: 10 INJECTION, EMULSION INTRAVENOUS at 08:09

## 2022-10-20 RX ADMIN — SODIUM CHLORIDE: 900 INJECTION, SOLUTION INTRAVENOUS at 09:44

## 2022-10-20 RX ADMIN — ROPIVACAINE HYDROCHLORIDE 30 ML: 5 INJECTION EPIDURAL; INFILTRATION; PERINEURAL at 07:46

## 2022-10-20 RX ADMIN — PROPOFOL 50 MG: 10 INJECTION, EMULSION INTRAVENOUS at 08:15

## 2022-10-20 RX ADMIN — SUCCINYLCHOLINE CHLORIDE 140 MG: 20 INJECTION, SOLUTION INTRAMUSCULAR; INTRAVENOUS at 08:09

## 2022-10-20 RX ADMIN — FENTANYL CITRATE 50 MCG: 50 INJECTION, SOLUTION INTRAMUSCULAR; INTRAVENOUS at 08:09

## 2022-10-20 RX ADMIN — SODIUM CHLORIDE, POTASSIUM CHLORIDE, SODIUM LACTATE AND CALCIUM CHLORIDE: 600; 310; 30; 20 INJECTION, SOLUTION INTRAVENOUS at 07:58

## 2022-10-20 RX ADMIN — ONDANSETRON HYDROCHLORIDE 4 MG: 2 INJECTION, SOLUTION INTRAMUSCULAR; INTRAVENOUS at 08:33

## 2022-10-20 RX ADMIN — FENTANYL CITRATE 50 MCG: 50 INJECTION, SOLUTION INTRAMUSCULAR; INTRAVENOUS at 09:13

## 2022-10-20 RX ADMIN — DEXAMETHASONE SODIUM PHOSPHATE 4 MG: 4 INJECTION, SOLUTION INTRAMUSCULAR; INTRAVENOUS at 08:33

## 2022-10-20 RX ADMIN — WATER 2 G: 1 INJECTION INTRAMUSCULAR; INTRAVENOUS; SUBCUTANEOUS at 08:28

## 2022-10-20 RX ADMIN — PROPOFOL 40 MCG/KG/MIN: 10 INJECTION, EMULSION INTRAVENOUS at 09:18

## 2022-10-20 RX ADMIN — Medication 10 MG: at 08:42

## 2022-10-20 NOTE — PERIOP NOTES
Timeout completed at 0745 prior to peripheral nerve block. Both consents have been signed and dated and patient has no further questions prior to sedation. Surgery site has been marked by MD Irina Flores. Patient placed on cardiac monitor x 3 and O2 via nasal cannula at 2L for support. Vital signs WDL before procedure start. Emergency equipment at bedside including intra lipids. Patient pre medicated with 2 mg of IV Versed and 50 mcg of IV Fentanyl per verbal MD order. 30 ml of ropivacaine injected at the site by the Anesthesiologist.  Patient tolerated procedure well. Vital signs WDL post procedure.

## 2022-10-20 NOTE — ANESTHESIA PREPROCEDURE EVALUATION
Relevant Problems   CARDIOVASCULAR   (+) Essential hypertension       Anesthetic History   No history of anesthetic complications            Review of Systems / Medical History  Patient summary reviewed, nursing notes reviewed and pertinent labs reviewed    Pulmonary  Within defined limits                 Neuro/Psych   Within defined limits           Cardiovascular  Within defined limits  Hypertension                   GI/Hepatic/Renal  Within defined limits              Endo/Other  Within defined limits           Other Findings              Physical Exam    Airway  Mallampati: II  TM Distance: > 6 cm  Neck ROM: normal range of motion   Mouth opening: Normal     Cardiovascular  Regular rate and rhythm,  S1 and S2 normal,  no murmur, click, rub, or gallop             Dental  No notable dental hx       Pulmonary  Breath sounds clear to auscultation               Abdominal  GI exam deferred       Other Findings            Anesthetic Plan    ASA: 2  Anesthesia type: general      Post-op pain plan if not by surgeon: peripheral nerve block single    Induction: Intravenous  Anesthetic plan and risks discussed with: Patient

## 2022-10-20 NOTE — H&P
Signed           Patient ID: Suhail Baumann is a 69 y.o. male. Pain Score:   5  Chief Complaint: Follow-up of the Left Shoulder        HISTORY OF PRESENT OF ILLNESS:  Faisal Bennett is a 69 y.o. male who presents for F/U of the left shoulder. We have been following him for his left shoulder rotator cuff pain. Since his last MRI there was a 2nd injury and that led to increasing pain. He is known to have a biceps rupture. It is unclear whether his current symptoms are rotator cuff related or from the stump of his biceps. They have increased since his more recent injury. He states that he's not in immense pain, however it has been obstructing his quality of life. The Pt states that he has been attending PT, which he states has not been particularly beneficial. He states that he has been uncomfortable even sleeping in bed. Past Medical History:   Diagnosis Date    Hyperlipidemia      Hypertension              Past Surgical History:   Procedure Laterality Date    HAND SURGERY        KNEE SURGERY        NO RELEVANT SURGERIES                Family History   Problem Relation Age of Onset    Coronary artery disease Father        Social History           Tobacco Use    Smoking status: Never    Smokeless tobacco: Never   Substance Use Topics    Alcohol use: Yes    Drug use: Never      Review of Systems   9/14/2022     Constitutional: Unexplained: Negative  Genitourinary: Frequent Urination: Positive  HEENT: Vision Loss: Negative  Neurological: Memory Loss: Negative  Integumentary: Rash: Negative  Cardiovascular: Palpatations: Negative  Hematologic: Bruises/Bleeds Easily: Negative  Gastrointestinal: Constipation: Negative  Immunological: Seasonal Allergies: Negative  Musculoskeletal: Joint Pain: Positive  Objective:          Vitals:     09/14/22 1726   Weight: 185 lb   Height: 5' 7\"         Constitutional:  No acute distress. Well nourished. Well developed. Psychiatric: Alert and oriented x3.   Eyes: Sclera are nonicteric. Respiratory:  No labored breathing. Cardiovascular:  No marked edema. Skin:  No marked skin ulcers. Neurological:  No marked sensory loss noted. Patient Active Problem List    Diagnosis Date Noted    Mixed hyperlipidemia 09/26/2022    Benign prostatic hyperplasia with lower urinary tract symptoms, symptom details unspecified 09/26/2022    Essential hypertension 09/26/2022     Past Medical History:   Diagnosis Date    Hypertension       Past Surgical History:   Procedure Laterality Date    HX CARPAL TUNNEL RELEASE Bilateral     HX COLONOSCOPY  2009    WITH DR. Choudhary Span    HX KNEE ARTHROSCOPY Bilateral     HX ORTHOPAEDIC Left 1970 and 1971    Knee arthroscopy    HX WISDOM TEETH EXTRACTION      IR BX LUNG/MEDIASTINUM NDL PERC S&I W IMAGING Left       Prior to Admission medications    Medication Sig Start Date End Date Taking? Authorizing Provider   amoxicillin-clavulanate (AUGMENTIN) 500-125 mg per tablet Take 1 Tablet by mouth two (2) times a day. 9/23/21  Yes Perry Michelle MD   lisinopril (PRINIVIL, ZESTRIL) 40 mg tablet  5/3/19  Yes Provider, Historical   meloxicam (MOBIC) 15 mg tablet TAKE 1 TABLET BY MOUTH ONCE DAILY AFTER FOOD 6/17/19  Yes Provider, Historical   rosuvastatin (CRESTOR) 10 mg tablet Take 10 mg by mouth daily. 4/26/19  Yes Provider, Historical   tadalafil (CIALIS) 5 mg tablet TAKE 1 TABLET BY MOUTH ONCE DAILY FOR BPH. 6/24/19   Provider, Historical     No Known Allergies   Social History     Tobacco Use    Smoking status: Never     Passive exposure: Never    Smokeless tobacco: Never   Substance Use Topics    Alcohol use:  Yes     Alcohol/week: 10.0 standard drinks     Types: 10 Glasses of wine per week      Family History   Problem Relation Age of Onset    Dementia Mother     Coronary Art Dis Father     Prostate Cancer Father     No Known Problems Sister     No Known Problems Sister     Other Son         DRUGS    No Known Problems Son     No Known Problems Son     Anesth Problems Neg Hx             Patient Vitals for the past 8 hrs:   BP Temp Pulse Resp SpO2 Height Weight   10/20/22 0758 -- -- 75 10 96 % -- --   10/20/22 0740 (!) 139/91 -- 70 15 -- -- --   10/20/22 0714 (!) 155/99 98.1 °F (36.7 °C) 74 17 96 % 5' 7\" (1.702 m) 85.2 kg (187 lb 13.3 oz)     General appearance: alert, cooperative, no distress, appears stated age  Head: Normocephalic, without obvious abnormality, atraumatic  Lungs: clear to auscultation bilaterally  Heart: regular rate and rhythm, S1, S2 normal, no murmur  Abdomen: soft, non-tender. Bowel sounds normal. No masses,  no organomegaly  Extremities: Left shoulder shows normal range of motion, but pain with all overhead motion. He has a very positive impingement sign anteriorly and laterally. There is pain with rotator cuff strength testing, but no weakness. He has tenderness with palpation at the [facility] joint and with cross-body adduction. There are no skin changes, rashes, deformity, or bruising. He has full strength. He has normal stability. He has good distal arm musculature. He has no edema. He has good pulses, good cap refill and good sensation distally. Pulses: 2+ and symmetric  Neurologic: Grossly normal      .           Radiographs:    MRI reviewed from prior shows evidence of a biceps rupture with a thin but not completely torn rotator cuff, impingement, and significant AC arthritis. Assessment:      1. Tendinitis of left rotator cuff    2. Traumatic tear of left rotator cuff, unspecified tear extent, initial encounter    3. Localized osteoarthrosis of left shoulder region    4. Rupture of left proximal biceps tendon, initial encounter           Patient Active Problem List   Diagnosis    Hypertension      Plan:      He has persistent pain of his shoulder status post 2 separate events 1 before and 1 after his MRI. We talked at length today and he is not able to enjoy his quality of life because of limitations due to shoulder. After careful discussion of options we decided to go forward with surgical intervention. We discussed several options of treatment with the Pt. We discussed ordering an MRI to determine again if there is a tear. We discussed just performing a procedure to repair the rotator cuff. Finally, we discussed the Pt just continuing to go along until the pain becomes severe enough to get something done. The Pt states that he is able to do surgery any time after October 15. It would be a left shoulder arthroscopy with subacromial decompression, distal clavicle excision, possible rotator cuff repair, and biceps tenodesis. No orders of the defined types were placed in this encounter. Return in about 6 weeks (around 10/26/2022) for surgery. Jessica Tomlin MD      Patient was seen and examined.   There have been no significant clinical changes since the completion of the above History and Physical.  Patient identified by surgeon; surgical site was confirmed by patient and surgeon

## 2022-10-20 NOTE — ANESTHESIA POSTPROCEDURE EVALUATION
Procedure(s):  LEFT SHOULDER ARTHROSCOPY, SUBACROMIAL DECOMPRESSION, DISTAL CLAVICLE RESECTION, ROTATOR CUFF REPAIR, OPEN BICEPS TENODESIS (GEN/BLOCK). general    Anesthesia Post Evaluation        Patient location during evaluation: PACU  Patient participation: complete - patient participated  Level of consciousness: awake and alert  Pain management: adequate  Airway patency: patent  Anesthetic complications: no  Cardiovascular status: acceptable  Respiratory status: acceptable  Hydration status: acceptable  Comments: I have seen and evaluated the patient and is ready for discharge.  Collette Nunn MD    Post anesthesia nausea and vomiting:  none      INITIAL Post-op Vital signs:   Vitals Value Taken Time   /100 10/20/22 1045   Temp 36.1 °C (97 °F) 10/20/22 0957   Pulse 87 10/20/22 1045   Resp 18 10/20/22 1045   SpO2 97 % 10/20/22 1045

## 2022-10-20 NOTE — DISCHARGE INSTRUCTIONS
Discharge Instructions  Rotator cuff repair       MD Zeinab Collier PA-C            Wound Care / Dressing Changes:  Two days after your surgery, you should remove your dressings. .  You can put a band-aid over each incision. It is not necessary to apply antibiotic ointment to your incisions. Lorenda Gear / Bathing: You may shower 2 days after your surgery. Your dressing may be removed for showering. You may get your incisions slightly wet in the shower. Do not vigorously scrub your incisions. Dry incisions well and apply Band-aids after showering. There is a incision with steri-strips. Lease leave the steri strips in place until they fall off. Sling    Please use the sling at all times except for bathing and doing your exercises. Do not forcefully extend your elbow and avoid actively lifting your arm away from your side and overhead. It is ok to use your hand in front of you. Ice and Elevation:  Ice therapy should be used consistently for 48-72  hours after surgery. Subsequently, you should ice 3 times per day for 20-30 minutes at a time for the next 2 weeks to reduce pain and swelling. Please ensure there is protective barrier between your skin and the ice. Diet:  You may advance your regular diet as tolerated. Increase your clear liquid intake for the next 2-3 days. Sleeping: It is often more comfortable to sleep in a propped up position like in a recliner or propped up with a bunch of pillows, but you may sleep how ever you are comfortable with your sling on. Medications:   A prescription for pain medication:          oxycodone  ______________________________________________________________    Be sure to start taking your pain medication before your nerve block wears all the way off.       All pain medications may cause constipation , so we recommend using otc stool softener such as Miralax , Colace or Milk of Magnesia . Other possible side effects of pain medications are dizziness, headache, nausea, vomiting, and urinary retention. Discontinue the pain medication if you develop itching, rash, shortness of breath, or difficulties swallowing. If these symptoms become severe or arent relieved by discontinuing the medication, you should seek immediate medical attention. It is ok to supplement pain meds with Ibuprofen or Aleve for the first 2 weeks. Post op appointment :   A post op appointment has been scheduled for you on :____________11/2 at 1:30pm with Crechet Rule PA-C _________________________________      Important Signs and Symptoms:  If you experience any of the following signs or symptoms, you should contact 's  office. Please be advised that if a problem arises which you feel requires immediate attention or you are unable to contact Dr. Hardik Benjamin office, you should seek immediate medical attention. If it is after 5:00pm call the main number [phone]           Signs and symptoms to watch for include:  A sudden increase in swelling and/or redness or warmth at the area of your surgery which isnt relieved by rest, ice, and elevation. Oral temperature greater than 101degrees for 12 hours or more which isnt relieved by an increase in fluid intake and taking Tylenol. Excessive drainage from your incisions or drainage which hasnt stopped by 72 hours after your surgery. Calf pain, fever, chills, shortness of breath, chest pain, nausea, vomiting or other signs and symptoms which are of concern to you       Thank you for following the above instructions. You may reach us through my chart or call us at [phone]    Exercises after Shoulder Surgery      1.  Codman Pendulum Exercises                                         Instructions:  Zhanna Joshi forward about 90° at the waist and support yourself on a sturdy object with your non surgical arm  (bend slightly at the knees to protect your back)  Gently allow your surgical arm to hang towards the ground  Move your hips forward and backward allowing your arm to swing slightly  Arm can move forward, backward, and in small circles (clockwise and counterclockwise)  Remember: let your body move your arm, do not use your arm muscles  Begin performing the exercise for about 30 seconds. Progress to 2 minutes.   Repeat 2-3 times per day

## 2023-02-16 ENCOUNTER — OFFICE VISIT (OUTPATIENT)
Dept: URGENT CARE | Age: 68
End: 2023-02-16
Payer: MEDICARE

## 2023-02-16 VITALS
HEART RATE: 93 BPM | WEIGHT: 191 LBS | DIASTOLIC BLOOD PRESSURE: 86 MMHG | SYSTOLIC BLOOD PRESSURE: 139 MMHG | OXYGEN SATURATION: 96 % | TEMPERATURE: 99.1 F | BODY MASS INDEX: 29.91 KG/M2 | RESPIRATION RATE: 18 BRPM

## 2023-02-16 DIAGNOSIS — H66.002 ACUTE SUPPURATIVE OTITIS MEDIA OF LEFT EAR WITHOUT SPONTANEOUS RUPTURE OF TYMPANIC MEMBRANE, RECURRENCE NOT SPECIFIED: Primary | ICD-10-CM

## 2023-02-16 DIAGNOSIS — H61.22 LEFT EAR IMPACTED CERUMEN: ICD-10-CM

## 2023-02-16 DIAGNOSIS — H92.02 LEFT EAR PAIN: ICD-10-CM

## 2023-02-16 PROCEDURE — G8536 NO DOC ELDER MAL SCRN: HCPCS | Performed by: NURSE PRACTITIONER

## 2023-02-16 PROCEDURE — G8432 DEP SCR NOT DOC, RNG: HCPCS | Performed by: NURSE PRACTITIONER

## 2023-02-16 PROCEDURE — 3017F COLORECTAL CA SCREEN DOC REV: CPT | Performed by: NURSE PRACTITIONER

## 2023-02-16 PROCEDURE — 69210 REMOVE IMPACTED EAR WAX UNI: CPT | Performed by: NURSE PRACTITIONER

## 2023-02-16 PROCEDURE — 3075F SYST BP GE 130 - 139MM HG: CPT | Performed by: NURSE PRACTITIONER

## 2023-02-16 PROCEDURE — G8427 DOCREV CUR MEDS BY ELIG CLIN: HCPCS | Performed by: NURSE PRACTITIONER

## 2023-02-16 PROCEDURE — 1123F ACP DISCUSS/DSCN MKR DOCD: CPT | Performed by: NURSE PRACTITIONER

## 2023-02-16 PROCEDURE — 1101F PT FALLS ASSESS-DOCD LE1/YR: CPT | Performed by: NURSE PRACTITIONER

## 2023-02-16 PROCEDURE — 3079F DIAST BP 80-89 MM HG: CPT | Performed by: NURSE PRACTITIONER

## 2023-02-16 PROCEDURE — 99202 OFFICE O/P NEW SF 15 MIN: CPT | Performed by: NURSE PRACTITIONER

## 2023-02-16 PROCEDURE — G8417 CALC BMI ABV UP PARAM F/U: HCPCS | Performed by: NURSE PRACTITIONER

## 2023-02-16 RX ORDER — LEVOFLOXACIN 750 MG/1
750 TABLET ORAL DAILY
Qty: 7 TABLET | Refills: 0 | Status: SHIPPED | OUTPATIENT
Start: 2023-02-16 | End: 2023-02-23

## 2023-02-16 NOTE — PATIENT INSTRUCTIONS
1. Acute suppurative otitis media of left ear without spontaneous rupture of tympanic membrane, recurrence not specified  Recommend daily antihistamine ie Claritin  Medication: Levofloxacin 750mg daily for 7 days  Follow up as needed for new or worsening symptoms    2. Left ear impacted cerumen  Continue Debrox occasionally     - REMOVE IMPACTED EAR WAX    3.  Left ear pain  Acetaminophen as needed

## 2023-02-16 NOTE — PROGRESS NOTES
The history is provided by the patient. Ear Pain  This is a recurrent (Recently dx with left ear infection, was treated 2 weeks ago with) problem. The current episode started more than 1 week ago (2 weeks ago). The problem has been gradually worsening. Associated symptoms include headaches. Pertinent negatives include no chest pain, no abdominal pain and no shortness of breath. Associated symptoms comments: Ear congestion, head congestion, decreased hearing. Denies runny nose, fever, chills, cough, sinus pain. . The symptoms are aggravated by bending, coughing and sneezing. Nothing relieves the symptoms. Treatments tried: antibiotics, CiproDex. The treatment provided no relief. Pt was seen again at Urgent Care in [place] and was prescribed CiproDex ear drops. Pt is currently taking Augmentin 500/125mg daily for chronic skin condition.      Past Medical History:   Diagnosis Date    Hypertension         Past Surgical History:   Procedure Laterality Date    HX CARPAL TUNNEL RELEASE Bilateral     HX COLONOSCOPY  2009    WITH DR. Wendy VILLALOBOS    HX KNEE ARTHROSCOPY Bilateral     HX ORTHOPAEDIC Left 1970 and 1971    Knee arthroscopy    HX WISDOM TEETH EXTRACTION      IR BX LUNG/MEDIASTINUM NDL PERC S&I W IMAGING Left          Family History   Problem Relation Age of Onset    Dementia Mother     Coronary Art Dis Father     Prostate Cancer Father     No Known Problems Sister     No Known Problems Sister     Other Son         DRUGS    No Known Problems Son     No Known Problems Son     Anesth Problems Neg Hx         Social History     Socioeconomic History    Marital status:      Spouse name: Not on file    Number of children: Not on file    Years of education: Not on file    Highest education level: Not on file   Occupational History    Not on file   Tobacco Use    Smoking status: Never     Passive exposure: Never    Smokeless tobacco: Never   Vaping Use    Vaping Use: Never used   Substance and Sexual Activity Alcohol use: Yes     Alcohol/week: 10.0 standard drinks     Types: 10 Glasses of wine per week    Drug use: Never    Sexual activity: Yes     Partners: Female   Other Topics Concern    Not on file   Social History Narrative    Not on file     Social Determinants of Health     Financial Resource Strain: Low Risk     Difficulty of Paying Living Expenses: Not hard at all   Food Insecurity: No Food Insecurity    Worried About Running Out of Food in the Last Year: Never true    Ran Out of Food in the Last Year: Never true   Transportation Needs: Not on file   Physical Activity: Not on file   Stress: Not on file   Social Connections: Not on file   Intimate Partner Violence: Not on file   Housing Stability: Not on file                ALLERGIES: Patient has no known allergies. Review of Systems   Constitutional:  Negative for activity change. HENT:  Positive for ear pain. Negative for congestion, postnasal drip, rhinorrhea, sinus pressure, sinus pain and sneezing. Respiratory:  Negative for cough and shortness of breath. Cardiovascular:  Negative for chest pain. Gastrointestinal:  Negative for abdominal pain. Neurological:  Positive for headaches. Vitals:    02/16/23 1411   BP: 139/86   Pulse: 93   Resp: 18   Temp: 99.1 °F (37.3 °C)   SpO2: 96%   Weight: 191 lb (86.6 kg)       Physical Exam  Constitutional:       Appearance: Normal appearance. He is not ill-appearing. HENT:      Right Ear: A middle ear effusion is present. Left Ear: There is impacted cerumen. Tympanic membrane is erythematous and retracted. Nose: No congestion or rhinorrhea. Mouth/Throat:      Mouth: Mucous membranes are moist.      Pharynx: No oropharyngeal exudate or posterior oropharyngeal erythema. Eyes:      Pupils: Pupils are equal, round, and reactive to light. Cardiovascular:      Rate and Rhythm: Normal rate and regular rhythm. Heart sounds: Normal heart sounds.    Pulmonary:      Breath sounds: Normal breath sounds. Musculoskeletal:         General: Normal range of motion. Cervical back: Neck supple. Lymphadenopathy:      Cervical: No cervical adenopathy. Neurological:      Mental Status: He is alert. Psychiatric:         Mood and Affect: Mood normal.         Behavior: Behavior normal.       [facility]     Differential Diagnosis; Clinical Impression; Plan:     (H66.002) Acute suppurative otitis media of left ear without spontaneous rupture of tympanic membrane, recurrence not specified  (primary encounter diagnosis)  (H61.22) Left ear impacted cerumen  (H92.02) Left ear pain        REMOVE IMPACTED EAR WAX    Date/Time: 2/16/2023 2:41 PM  Performed by: Kelsie Zapata NP  Authorized by: Kelsie Zapata NP     Procedure details:     Location:  L ear    Procedure type: curette and irrigation    Post-procedure details: Inspection:  TM intact and erythematous    Hearing quality:  Improved    Patient tolerance of procedure: Tolerated well, no immediate complications      1. Acute suppurative otitis media of left ear without spontaneous rupture of tympanic membrane, recurrence not specified  Recommend daily antihistamine ie Claritin  Medication: Levofloxacin 750mg daily for 7 days  Follow up as needed for new or worsening symptoms    2. Left ear impacted cerumen  Continue Debrox occasionally     - REMOVE IMPACTED EAR WAX    3.  Left ear pain  Acetaminophen as needed

## 2024-01-22 PROBLEM — M17.12 DEGENERATIVE ARTHRITIS OF LEFT KNEE: Status: ACTIVE | Noted: 2024-01-22

## 2024-03-14 ENCOUNTER — HOSPITAL ENCOUNTER (OUTPATIENT)
Facility: HOSPITAL | Age: 69
Discharge: HOME | End: 2024-03-14
Payer: MEDICARE

## 2024-03-14 VITALS
HEIGHT: 67 IN | TEMPERATURE: 97.9 F | BODY MASS INDEX: 29.51 KG/M2 | DIASTOLIC BLOOD PRESSURE: 85 MMHG | SYSTOLIC BLOOD PRESSURE: 146 MMHG | WEIGHT: 188 LBS | HEART RATE: 83 BPM

## 2024-03-14 LAB
ABO + RH BLD: NORMAL
ANION GAP SERPL CALC-SCNC: 4 MMOL/L (ref 5–15)
APPEARANCE UR: CLEAR
BACTERIA URNS QL MICRO: NEGATIVE /HPF
BILIRUB UR QL: NEGATIVE
BLOOD GROUP ANTIBODIES SERPL: NORMAL
BUN SERPL-MCNC: 16 MG/DL (ref 6–20)
BUN/CREAT SERPL: 20 (ref 12–20)
CALCIUM SERPL-MCNC: <10 MG/DL (ref 8.5–10.1)
CHLORIDE SERPL-SCNC: 109 MMOL/L (ref 97–108)
CO2 SERPL-SCNC: 28 MMOL/L (ref 21–32)
COLOR UR: NORMAL
CREAT SERPL-MCNC: 0.8 MG/DL (ref 0.7–1.3)
EKG ATRIAL RATE: 64 BPM
EKG DIAGNOSIS: NORMAL
EKG P AXIS: 59 DEGREES
EKG P-R INTERVAL: 204 MS
EKG Q-T INTERVAL: 402 MS
EKG QRS DURATION: 76 MS
EKG QTC CALCULATION (BAZETT): 414 MS
EKG R AXIS: -4 DEGREES
EKG T AXIS: 69 DEGREES
EKG VENTRICULAR RATE: 64 BPM
EPITH CASTS URNS QL MICRO: NORMAL /LPF
ERYTHROCYTE [DISTWIDTH] IN BLOOD BY AUTOMATED COUNT: 12.1 % (ref 11.5–14.5)
EST. AVERAGE GLUCOSE BLD GHB EST-MCNC: 100 MG/DL
GLUCOSE SERPL-MCNC: 98 MG/DL (ref 65–100)
GLUCOSE UR STRIP.AUTO-MCNC: NEGATIVE MG/DL
HBA1C MFR BLD: 5.1 % (ref 4–5.6)
HCT VFR BLD AUTO: 43.7 % (ref 36.6–50.3)
HGB BLD-MCNC: 14.6 G/DL (ref 12.1–17)
HGB UR QL STRIP: NEGATIVE
HYALINE CASTS URNS QL MICRO: NORMAL /LPF (ref 0–5)
INR PPP: 1 (ref 0.9–1.1)
KETONES UR QL STRIP.AUTO: NEGATIVE MG/DL
LEUKOCYTE ESTERASE UR QL STRIP.AUTO: NEGATIVE
MCH RBC QN AUTO: 30.6 PG (ref 26–34)
MCHC RBC AUTO-ENTMCNC: 33.4 G/DL (ref 30–36.5)
MCV RBC AUTO: 91.6 FL (ref 80–99)
NITRITE UR QL STRIP.AUTO: NEGATIVE
NRBC # BLD: 0 K/UL (ref 0–0.01)
NRBC BLD-RTO: 0 PER 100 WBC
PH UR STRIP: 7 (ref 5–8)
PLATELET # BLD AUTO: 215 K/UL (ref 150–400)
PMV BLD AUTO: 9.7 FL (ref 8.9–12.9)
POTASSIUM SERPL-SCNC: 4.7 MMOL/L (ref 3.5–5.1)
PROT UR STRIP-MCNC: NEGATIVE MG/DL
PROTHROMBIN TIME: 10.3 SEC (ref 9–11.1)
RBC # BLD AUTO: 4.77 M/UL (ref 4.1–5.7)
RBC #/AREA URNS HPF: NORMAL /HPF (ref 0–5)
SODIUM SERPL-SCNC: 141 MMOL/L (ref 136–145)
SP GR UR REFRACTOMETRY: 1.01 (ref 1–1.03)
SPECIMEN EXP DATE BLD: NORMAL
URINE CULTURE IF INDICATED: NORMAL
UROBILINOGEN UR QL STRIP.AUTO: 0.2 EU/DL (ref 0.2–1)
WBC # BLD AUTO: 6.2 K/UL (ref 4.1–11.1)
WBC URNS QL MICRO: NORMAL /HPF (ref 0–4)

## 2024-03-14 PROCEDURE — 86901 BLOOD TYPING SEROLOGIC RH(D): CPT

## 2024-03-14 PROCEDURE — 85027 COMPLETE CBC AUTOMATED: CPT

## 2024-03-14 PROCEDURE — 85610 PROTHROMBIN TIME: CPT

## 2024-03-14 PROCEDURE — 36415 COLL VENOUS BLD VENIPUNCTURE: CPT

## 2024-03-14 PROCEDURE — 86900 BLOOD TYPING SEROLOGIC ABO: CPT

## 2024-03-14 PROCEDURE — APPNB30 APP NON BILLABLE TIME 0-30 MINS: Performed by: NURSE PRACTITIONER

## 2024-03-14 PROCEDURE — 83036 HEMOGLOBIN GLYCOSYLATED A1C: CPT

## 2024-03-14 PROCEDURE — 81001 URINALYSIS AUTO W/SCOPE: CPT

## 2024-03-14 PROCEDURE — 93010 ELECTROCARDIOGRAM REPORT: CPT | Performed by: INTERNAL MEDICINE

## 2024-03-14 PROCEDURE — 93005 ELECTROCARDIOGRAM TRACING: CPT | Performed by: ORTHOPAEDIC SURGERY

## 2024-03-14 PROCEDURE — 86850 RBC ANTIBODY SCREEN: CPT

## 2024-03-14 PROCEDURE — 80048 BASIC METABOLIC PNL TOTAL CA: CPT

## 2024-03-14 RX ORDER — MELOXICAM 15 MG/1
15 TABLET ORAL DAILY
COMMUNITY

## 2024-03-14 RX ORDER — ACETAMINOPHEN 500 MG
500 TABLET ORAL EVERY 6 HOURS PRN
COMMUNITY

## 2024-03-14 ASSESSMENT — KOOS JR
STANDING UPRIGHT: SEVERE
RISING FROM SITTING: 1
TWISING OR PIVOTING ON KNEE: 1
STRAIGHTENING KNEE FULLY: MODERATE
RISING FROM SITTING: MILD
STRAIGHTENING KNEE FULLY: 2
BENDING TO THE FLOOR TO PICK UP OBJECT: 1
GOING UP OR DOWN STAIRS: MILD
GOING UP OR DOWN STAIRS: 1
HOW SEVERE IS YOUR KNEE STIFFNESS AFTER FIRST WAKING IN MORNING: 1
HOW SEVERE IS YOUR KNEE STIFFNESS AFTER FIRST WAKING IN MORNING: MILD
KOOS JR TOTAL INTERVAL SCORE: 61.583
STANDING UPRIGHT: 3
TWISING OR PIVOTING ON KNEE: MILD
BENDING TO THE FLOOR TO PICK UP OBJECT: MILD

## 2024-03-14 ASSESSMENT — PROMIS GLOBAL HEALTH SCALE
IN GENERAL, HOW WOULD YOU RATE YOUR PHYSICAL HEALTH [ON A SCALE OF 1 (POOR) TO 5 (EXCELLENT)]?: 4
IN THE PAST 7 DAYS, HOW OFTEN HAVE YOU BEEN BOTHERED BY EMOTIONAL PROBLEMS, SUCH AS FEELING ANXIOUS, DEPRESSED, OR IRRITABLE [ON A SCALE FROM 1 (NEVER) TO 5 (ALWAYS)]?: NEVER
TO WHAT EXTENT ARE YOU ABLE TO CARRY OUT YOUR EVERYDAY PHYSICAL ACTIVITIES SUCH AS WALKING, CLIMBING STAIRS, CARRYING GROCERIES, OR MOVING A CHAIR [ON A SCALE OF 1 (NOT AT ALL) TO 5 (COMPLETELY)]?: 4
IN GENERAL, HOW WOULD YOU RATE YOUR PHYSICAL HEALTH [ON A SCALE OF 1 (POOR) TO 5 (EXCELLENT)]?: VERY GOOD
IN THE PAST 7 DAYS, HOW OFTEN HAVE YOU BEEN BOTHERED BY EMOTIONAL PROBLEMS, SUCH AS FEELING ANXIOUS, DEPRESSED, OR IRRITABLE [ON A SCALE FROM 1 (NEVER) TO 5 (ALWAYS)]?: 5
IN GENERAL, WOULD YOU SAY YOUR QUALITY OF LIFE IS...[ON A SCALE OF 1 (POOR) TO 5 (EXCELLENT)]: VERY GOOD
IN GENERAL, WOULD YOU SAY YOUR QUALITY OF LIFE IS...[ON A SCALE OF 1 (POOR) TO 5 (EXCELLENT)]: 4
IN GENERAL, HOW WOULD YOU RATE YOUR MENTAL HEALTH, INCLUDING YOUR MOOD AND YOUR ABILITY TO THINK [ON A SCALE OF 1 (POOR) TO 5 (EXCELLENT)]?: 4
IN THE PAST 7 DAYS, HOW WOULD YOU RATE YOUR PAIN ON AVERAGE [ON A SCALE FROM 0 (NO PAIN) TO 10 (WORST IMAGINABLE PAIN)]?: 2
WHO IS THE PERSON COMPLETING THE PROMIS V1.1 SURVEY?: SELF
SUM OF RESPONSES TO QUESTIONS 2, 4, 5, & 10: 17
HOW IS THE PROMIS V1.1 BEING ADMINISTERED?: 0
SUM OF RESPONSES TO QUESTIONS 3, 6, 7, & 8: 14
IN THE PAST 7 DAYS, HOW WOULD YOU RATE YOUR FATIGUE ON AVERAGE [ON A SCALE FROM 1 (NONE) TO 5 (VERY SEVERE)]?: 4
IN GENERAL, HOW WOULD YOU RATE YOUR MENTAL HEALTH, INCLUDING YOUR MOOD AND YOUR ABILITY TO THINK [ON A SCALE OF 1 (POOR) TO 5 (EXCELLENT)]?: VERY GOOD
TO WHAT EXTENT ARE YOU ABLE TO CARRY OUT YOUR EVERYDAY PHYSICAL ACTIVITIES SUCH AS WALKING, CLIMBING STAIRS, CARRYING GROCERIES, OR MOVING A CHAIR [ON A SCALE OF 1 (NOT AT ALL) TO 5 (COMPLETELY)]?: MOSTLY
IN GENERAL, HOW WOULD YOU RATE YOUR SATISFACTION WITH YOUR SOCIAL ACTIVITIES AND RELATIONSHIPS [ON A SCALE OF 1 (POOR) TO 5 (EXCELLENT)]?: 4
HOW IS THE PROMIS V1.1 BEING ADMINISTERED?: PAPER
IN GENERAL, PLEASE RATE HOW WELL YOU CARRY OUT YOUR USUAL SOCIAL ACTIVITIES (INCLUDES ACTIVITIES AT HOME, AT WORK, AND IN YOUR COMMUNITY, AND RESPONSIBILITIES AS A PARENT, CHILD, SPOUSE, EMPLOYEE, FRIEND, ETC) [ON A SCALE OF 1 (POOR) TO 5 (EXCELLENT)]?: VERY GOOD
IN GENERAL, PLEASE RATE HOW WELL YOU CARRY OUT YOUR USUAL SOCIAL ACTIVITIES (INCLUDES ACTIVITIES AT HOME, AT WORK, AND IN YOUR COMMUNITY, AND RESPONSIBILITIES AS A PARENT, CHILD, SPOUSE, EMPLOYEE, FRIEND, ETC) [ON A SCALE OF 1 (POOR) TO 5 (EXCELLENT)]?: 4
IN GENERAL, WOULD YOU SAY YOUR HEALTH IS...[ON A SCALE OF 1 (POOR) TO 5 (EXCELLENT)]: 4
IN GENERAL, WOULD YOU SAY YOUR HEALTH IS...[ON A SCALE OF 1 (POOR) TO 5 (EXCELLENT)]: VERY GOOD
WHO IS THE PERSON COMPLETING THE PROMIS V1.1 SURVEY?: 0
IN GENERAL, HOW WOULD YOU RATE YOUR SATISFACTION WITH YOUR SOCIAL ACTIVITIES AND RELATIONSHIPS [ON A SCALE OF 1 (POOR) TO 5 (EXCELLENT)]?: VERY GOOD

## 2024-03-14 NOTE — PERIOP NOTE
PATIENT GIVEN SURGICAL SITE INFECTION FAQ HANDOUT AND HAND WASHING TIP SHEET. PREOP INSTRUCTIONS REVIEWED AND PATIENT VERBALIZES UNDERSTANDING OF INSTRUCTIONS. PATIENT HAS BEEN GIVEN THE OPPORTUNITY TO ASK ADDITIONAL QUESTIONS.    CALLED DR. PANDA OFFICE  FOR EKG DONE 3/4/24, BUT OFFICE STATED THEY COULDN'T \"FIND \" IT. SO I DID ONE IN P.A.T FOR PREOP AND HISTORY OF HYPERTENSION. LAST OV NOTE IN [facility] FROM DR. PANDA (CARDIO) IN EPIC

## 2024-03-14 NOTE — PERIOP NOTE
[facility] PREOPERATIVE INSTRUCTIONS  ORTHOPAEDIC    Surgery Date:   3/28/24    Your surgeon's office or [facility] staff will call you between 4 PM- 8 PM the day before surgery with your arrival time.  If your surgery is on a Monday, you will receive a call the preceding Friday. If your surgeon's office has given you, your arrival time then go by that time.    Please report to [facility] Patient Access/Admitting on the 1st floor.  Bring your insurance card, photo identification, and any copayment (if applicable).   If you are going home the same day of your surgery, you must have a responsible adult to drive you home. You need to have a responsible adult to stay with you the first 24 hours after surgery and you should not drive a car for 24 hours following your surgery.  If you are being admitted to the hospital,please leave personal belongings/luggage in your car until you have an assigned hospital room number.  Please wear comfortable clothes. Wear your glasses instead of contacts. We ask that all money, jewelry and valuables be left at home. Wear no make up, particularly mascara, the day of surgery.  Do NOT drink alcohol or smoke 24 hours before surgery. STOP smoking for 14 days prior as it helps with breathing and healing after surgery.   All body piercings, rings, and jewelry need to be removed and left at home. Do not wear any fingernail polish except for clear. Please wear your hair loose or down. Please no pony-tails, buns, or any metal hair accessories. You may wear deodorant. Do not shave any body area within 24 hours of your surgery.  Please follow all instructions to avoid any potential surgical cancellation.  Should your physical condition change, (i.e. fever, cold, flu, etc.) please notify your surgeon as soon as possible.  It is important to be on time. If a situation occurs where you may be delayed, please call:  [phone] / [phone] on the day of surgery.  The Preadmission  surgery. Do not allow pets to sleep with you.        Day of Procedure    Please park in the parking deck or any designated visitor parking lot.   Enter the facility through the Main Entrance of the hospital.  On the day of surgery, please provide the cell phone number of the person who will be waiting for you to the Patient Access representative at the time of registration.  Masks are highly recommended in the hospital, but not required.  Once your procedure and the immediate recovery period is completed, a nurse in the recovery area will contact your designated visitor to inform them of your room number or to otherwise review other pertinent information regarding your care.    Social distancing practices are strongly encouraged in waiting areas and the cafeteria.       The patient was contacted in person.   He verbalized understanding of all instructions does not need reinforcement.

## 2024-03-15 LAB
BACTERIA SPEC CULT: NORMAL
BACTERIA SPEC CULT: NORMAL
SERVICE CMNT-IMP: NORMAL

## 2024-03-15 NOTE — PERIOP NOTE
PAT Nurse Practitioner   Pre-Operative Chart Review/Assessment:-ORTHOPEDIC                Patient Name:  Yasmany Zamora III                                                           Age:   68 y.o.    :  1955     Today's Date:  3/18/2024     Date of PAT:   3/14/24      Date of Surgery:    3/28/24      Procedure(s):  Left Total Knee Arthroplasty     Anesthesia:  Spinal     Surgeon:   Dr. Andujar                       PLAN:      1)  PCP:  Siva Marcus MD      2)  Cardiac Clearance:  Pt followed by Dr. Rico([facility]). LOV 3/4/24. Clearance obtained. OV note on chart and scanned in Epic. PAT EKG showed normal sinus rhythm and non-specific T wave abn.       3)  Diabetic Treatment Consult:  Not indicated. A1c-5.1      4)  Sleep Apnea evaluation:   Not indicated. HANY Score 3.       5) Treatment for MRSA/Staph Aureus:  Neg      6) Discharge Plan:  Home w/ spouse      7) Additional Concerns:  HTN, BPH      8) Special Med Recs:  Hold tadalafil 5 days PTS. Hold olmesartan DOS.      Additional Orders for Day of Surgery:  none                Vital Signs:        Vitals:    24 1111   BP: (!) 146/85   Pulse: 83   Temp: 97.9 °F (36.6 °C)             Body mass index is 29.44 kg/m².       KNEE DISABILITY OSTEOARTHRITIS AND OUTCOME SCORE    Stiffness - The following question concerns the amount of joing stiffness you have experienced during the last week in your knee. Stiffness is a sensation of restriction or slowness in the ease with which you move your knee joint.  How severe is your knee stiffness after first wakening in the morning?: Mild    Pain - What amount of knee pain have you experienced the last week during the following activities?  Twisting/pivoting on your knee: Mild  Straightening knee fully: Moderate  Going up or down stairs: Mild  Standing upright: Severe    Function - Please indicate the degree of difficulty you have experienced in the last week due to your knee.  Rising from sitting: Mild  Bending to  Evidence-Based Clinical Practice Guidelines, 8th Edition.    Protime 03/14/2024 10.3  9.0 - 11.1 sec Final    Ventricular Rate 03/14/2024 64  BPM Final    Atrial Rate 03/14/2024 64  BPM Final    P-R Interval 03/14/2024 204  ms Final    QRS Duration 03/14/2024 76  ms Final    Q-T Interval 03/14/2024 402  ms Final    QTc Calculation (Bazett) 03/14/2024 414  ms Final    P Axis 03/14/2024 59  degrees Final    R Axis 03/14/2024 -4  degrees Final    T Axis 03/14/2024 69  degrees Final    Diagnosis 03/14/2024    Final                    Value:Normal sinus rhythm  Nonspecific T wave abnormality    When compared with ECG of 18-NOV-2005 10:46,  No significant change    Confirmed by JUAN DIEGO Pop John ([id number]) on 3/14/2024 10:29:51 PM            Skin:     Denies open wounds, cuts, sores, rashes or other areas of concern in PAT assessment.          FILIBERTO WEBBER - NP  Available via xaitment

## 2024-03-18 PROBLEM — Z01.818 ENCOUNTER FOR PREADMISSION TESTING: Status: ACTIVE | Noted: 2024-03-18

## 2024-03-22 ENCOUNTER — OFFICE VISIT (OUTPATIENT)
Age: 69
End: 2024-03-22

## 2024-03-22 VITALS
SYSTOLIC BLOOD PRESSURE: 133 MMHG | RESPIRATION RATE: 19 BRPM | HEART RATE: 73 BPM | WEIGHT: 186 LBS | OXYGEN SATURATION: 98 % | DIASTOLIC BLOOD PRESSURE: 74 MMHG | BODY MASS INDEX: 29.13 KG/M2 | TEMPERATURE: 98 F

## 2024-03-22 DIAGNOSIS — L85.3 DRY SKIN: Primary | ICD-10-CM

## 2024-03-22 DIAGNOSIS — L11.1 GROVER'S DISEASE: ICD-10-CM

## 2024-03-22 RX ORDER — TRIAMCINOLONE ACETONIDE 5 MG/G
CREAM TOPICAL
Qty: 15 G | Refills: 1 | Status: SHIPPED | OUTPATIENT
Start: 2024-03-22 | End: 2024-03-29

## 2024-03-22 NOTE — PROGRESS NOTES
Subjective: (As above and below)     The patient/guardian gave verbal consent to treat.        Chief Complaint   Patient presents with    Skin Problem     Pt noted a \"blemish\" on left lower leg several days ago wanted it checked he is to have Left knee replacement on Thursday.       Yasmany Zamora III is a 68 y.o. male who presents for evaluation of :   blemish & dry skin on his left knee,  has left knee surgery coming up wants to make sure it is not infected, doesn't want his surgery to be rescheduled. NO swelling, tenderness, drainage or abscess on bilateral lower extremity.   HPI      Review of Systems   Skin:  Positive for rash.       Review of Systems - negative except as listed above    Reviewed PmHx, RxHx, FmHx, SocHx, AllgHx and updated in chart.  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    Hyperlipidemia     Hypertension     Osteoarthritis       Social History     Socioeconomic History    Marital status:      Spouse name: None    Number of children: None    Years of education: None    Highest education level: None   Tobacco Use    Smoking status: Never     Passive exposure: Never    Smokeless tobacco: Never   Substance and Sexual Activity    Alcohol use: Yes     Alcohol/week: 2.0 standard drinks of alcohol     Types: 2 Glasses of wine per week     Comment: 14 GLASES OF WINE WEEKLY    Drug use: Never    Sexual activity: Yes     Partners: Female     Social Determinants of Health     Financial Resource Strain: Low Risk  (9/26/2022)    Overall Financial Resource Strain (CARDIA)     Difficulty of Paying Living Expenses: Not hard at all   Food

## 2024-03-27 ENCOUNTER — ANESTHESIA EVENT (OUTPATIENT)
Facility: HOSPITAL | Age: 69
End: 2024-03-27
Payer: MEDICARE

## 2024-03-28 ENCOUNTER — ANESTHESIA (OUTPATIENT)
Facility: HOSPITAL | Age: 69
End: 2024-03-28
Payer: MEDICARE

## 2024-03-28 ENCOUNTER — HOSPITAL ENCOUNTER (OUTPATIENT)
Facility: HOSPITAL | Age: 69
Setting detail: OBSERVATION
Discharge: HOME | End: 2024-03-29
Attending: ORTHOPAEDIC SURGERY | Admitting: ORTHOPAEDIC SURGERY
Payer: MEDICARE

## 2024-03-28 DIAGNOSIS — Z96.652 STATUS POST TOTAL LEFT KNEE REPLACEMENT: Primary | ICD-10-CM

## 2024-03-28 PROBLEM — M17.12 PRIMARY OSTEOARTHRITIS OF LEFT KNEE: Status: ACTIVE | Noted: 2024-03-28

## 2024-03-28 LAB
GLUCOSE BLD STRIP.AUTO-MCNC: 107 MG/DL (ref 65–117)
SERVICE CMNT-IMP: NORMAL

## 2024-03-28 PROCEDURE — 7100000000 HC PACU RECOVERY - FIRST 15 MIN: Performed by: ORTHOPAEDIC SURGERY

## 2024-03-28 PROCEDURE — 6360000002 HC RX W HCPCS: Performed by: PHYSICIAN ASSISTANT

## 2024-03-28 PROCEDURE — 2580000003 HC RX 258: Performed by: PHYSICIAN ASSISTANT

## 2024-03-28 PROCEDURE — 2580000003 HC RX 258: Performed by: ANESTHESIOLOGY

## 2024-03-28 PROCEDURE — 7100000001 HC PACU RECOVERY - ADDTL 15 MIN: Performed by: ORTHOPAEDIC SURGERY

## 2024-03-28 PROCEDURE — C1776 JOINT DEVICE (IMPLANTABLE): HCPCS | Performed by: ORTHOPAEDIC SURGERY

## 2024-03-28 PROCEDURE — 6370000000 HC RX 637 (ALT 250 FOR IP): Performed by: PHYSICIAN ASSISTANT

## 2024-03-28 PROCEDURE — 6360000002 HC RX W HCPCS: Performed by: ANESTHESIOLOGY

## 2024-03-28 PROCEDURE — 97116 GAIT TRAINING THERAPY: CPT

## 2024-03-28 PROCEDURE — 2500000003 HC RX 250 WO HCPCS: Performed by: NURSE ANESTHETIST, CERTIFIED REGISTERED

## 2024-03-28 PROCEDURE — G0378 HOSPITAL OBSERVATION PER HR: HCPCS

## 2024-03-28 PROCEDURE — 6360000002 HC RX W HCPCS: Performed by: NURSE ANESTHETIST, CERTIFIED REGISTERED

## 2024-03-28 PROCEDURE — 3600000015 HC SURGERY LEVEL 5 ADDTL 15MIN: Performed by: ORTHOPAEDIC SURGERY

## 2024-03-28 PROCEDURE — 94760 N-INVAS EAR/PLS OXIMETRY 1: CPT

## 2024-03-28 PROCEDURE — 97161 PT EVAL LOW COMPLEX 20 MIN: CPT

## 2024-03-28 PROCEDURE — 2700000000 HC OXYGEN THERAPY PER DAY

## 2024-03-28 PROCEDURE — 64447 NJX AA&/STRD FEMORAL NRV IMG: CPT | Performed by: STUDENT IN AN ORGANIZED HEALTH CARE EDUCATION/TRAINING PROGRAM

## 2024-03-28 PROCEDURE — 20985 CPTR-ASST DIR MS PX: CPT | Performed by: PHYSICIAN ASSISTANT

## 2024-03-28 PROCEDURE — 27447 TOTAL KNEE ARTHROPLASTY: CPT | Performed by: ORTHOPAEDIC SURGERY

## 2024-03-28 PROCEDURE — 97530 THERAPEUTIC ACTIVITIES: CPT

## 2024-03-28 PROCEDURE — 3700000000 HC ANESTHESIA ATTENDED CARE: Performed by: ORTHOPAEDIC SURGERY

## 2024-03-28 PROCEDURE — 20985 CPTR-ASST DIR MS PX: CPT | Performed by: ORTHOPAEDIC SURGERY

## 2024-03-28 PROCEDURE — 6370000000 HC RX 637 (ALT 250 FOR IP): Performed by: ANESTHESIOLOGY

## 2024-03-28 PROCEDURE — 6370000000 HC RX 637 (ALT 250 FOR IP): Performed by: ORTHOPAEDIC SURGERY

## 2024-03-28 PROCEDURE — 3600000005 HC SURGERY LEVEL 5 BASE: Performed by: ORTHOPAEDIC SURGERY

## 2024-03-28 PROCEDURE — 2709999900 HC NON-CHARGEABLE SUPPLY: Performed by: ORTHOPAEDIC SURGERY

## 2024-03-28 PROCEDURE — 2720000010 HC SURG SUPPLY STERILE: Performed by: ORTHOPAEDIC SURGERY

## 2024-03-28 PROCEDURE — 3700000001 HC ADD 15 MINUTES (ANESTHESIA): Performed by: ORTHOPAEDIC SURGERY

## 2024-03-28 PROCEDURE — 27447 TOTAL KNEE ARTHROPLASTY: CPT | Performed by: PHYSICIAN ASSISTANT

## 2024-03-28 PROCEDURE — 82962 GLUCOSE BLOOD TEST: CPT

## 2024-03-28 PROCEDURE — C1713 ANCHOR/SCREW BN/BN,TIS/BN: HCPCS | Performed by: ORTHOPAEDIC SURGERY

## 2024-03-28 PROCEDURE — 27130 TOTAL HIP ARTHROPLASTY: CPT | Performed by: PHYSICIAN ASSISTANT

## 2024-03-28 DEVICE — DOMED TRI-PEG PATELLA, 32X8MM, E-PLUS
Type: IMPLANTABLE DEVICE | Site: KNEE | Status: FUNCTIONAL
Brand: DJO SURGICAL

## 2024-03-28 DEVICE — CEMENT BNE MED VISC 80 GM GENTAMICIN PALACOS MV+G PRO: Type: IMPLANTABLE DEVICE | Site: KNEE | Status: FUNCTIONAL

## 2024-03-28 DEVICE — EMPOWR 3D KNEETM FEMUR, NP, 9L
Type: IMPLANTABLE DEVICE | Site: KNEE | Status: FUNCTIONAL
Brand: DJO SURGICAL

## 2024-03-28 DEVICE — DJO EMPOWR KNEETM, FIN BP, NP, 10L
Type: IMPLANTABLE DEVICE | Site: KNEE | Status: FUNCTIONAL
Brand: DJO SURGICAL

## 2024-03-28 DEVICE — IMPL CAPPED KNEE K1 TOTAL/HEMI STD CEMENTED DJO: Type: IMPLANTABLE DEVICE | Site: KNEE | Status: FUNCTIONAL

## 2024-03-28 DEVICE — EMPOWR 3D KNEETM INS, 10L 16MM, VE
Type: IMPLANTABLE DEVICE | Site: KNEE | Status: FUNCTIONAL
Brand: DJO SURGICAL

## 2024-03-28 RX ORDER — NALOXONE HYDROCHLORIDE 0.4 MG/ML
INJECTION, SOLUTION INTRAMUSCULAR; INTRAVENOUS; SUBCUTANEOUS PRN
Status: DISCONTINUED | OUTPATIENT
Start: 2024-03-28 | End: 2024-03-29 | Stop reason: HOSPADM

## 2024-03-28 RX ORDER — 0.9 % SODIUM CHLORIDE 0.9 %
500 INTRAVENOUS SOLUTION INTRAVENOUS PRN
Status: DISCONTINUED | OUTPATIENT
Start: 2024-03-28 | End: 2024-03-29 | Stop reason: HOSPADM

## 2024-03-28 RX ORDER — BISACODYL 10 MG
10 SUPPOSITORY, RECTAL RECTAL DAILY PRN
Status: DISCONTINUED | OUTPATIENT
Start: 2024-03-28 | End: 2024-03-29 | Stop reason: HOSPADM

## 2024-03-28 RX ORDER — LORAZEPAM 2 MG/ML
0.5 INJECTION INTRAMUSCULAR
Status: DISCONTINUED | OUTPATIENT
Start: 2024-03-28 | End: 2024-03-28

## 2024-03-28 RX ORDER — TAMSULOSIN HYDROCHLORIDE 0.4 MG/1
0.4 CAPSULE ORAL 2 TIMES DAILY
Status: DISCONTINUED | OUTPATIENT
Start: 2024-03-28 | End: 2024-03-29 | Stop reason: HOSPADM

## 2024-03-28 RX ORDER — IPRATROPIUM BROMIDE AND ALBUTEROL SULFATE 2.5; .5 MG/3ML; MG/3ML
1 SOLUTION RESPIRATORY (INHALATION)
Status: ACTIVE | OUTPATIENT
Start: 2024-03-28 | End: 2024-03-29

## 2024-03-28 RX ORDER — SODIUM CHLORIDE 0.9 % (FLUSH) 0.9 %
5-40 SYRINGE (ML) INJECTION EVERY 12 HOURS SCHEDULED
Status: DISCONTINUED | OUTPATIENT
Start: 2024-03-28 | End: 2024-03-29 | Stop reason: HOSPADM

## 2024-03-28 RX ORDER — SODIUM CHLORIDE, SODIUM LACTATE, POTASSIUM CHLORIDE, CALCIUM CHLORIDE 600; 310; 30; 20 MG/100ML; MG/100ML; MG/100ML; MG/100ML
INJECTION, SOLUTION INTRAVENOUS CONTINUOUS
Status: DISCONTINUED | OUTPATIENT
Start: 2024-03-28 | End: 2024-03-28 | Stop reason: HOSPADM

## 2024-03-28 RX ORDER — ONDANSETRON 2 MG/ML
INJECTION INTRAMUSCULAR; INTRAVENOUS PRN
Status: DISCONTINUED | OUTPATIENT
Start: 2024-03-28 | End: 2024-03-28 | Stop reason: SDUPTHER

## 2024-03-28 RX ORDER — TRANEXAMIC ACID 100 MG/ML
INJECTION, SOLUTION INTRAVENOUS PRN
Status: DISCONTINUED | OUTPATIENT
Start: 2024-03-28 | End: 2024-03-28 | Stop reason: SDUPTHER

## 2024-03-28 RX ORDER — ONDANSETRON 4 MG/1
4 TABLET, ORALLY DISINTEGRATING ORAL EVERY 8 HOURS PRN
Status: DISCONTINUED | OUTPATIENT
Start: 2024-03-28 | End: 2024-03-29 | Stop reason: HOSPADM

## 2024-03-28 RX ORDER — SENNA AND DOCUSATE SODIUM 50; 8.6 MG/1; MG/1
1 TABLET, FILM COATED ORAL 2 TIMES DAILY
Status: DISCONTINUED | OUTPATIENT
Start: 2024-03-28 | End: 2024-03-29 | Stop reason: HOSPADM

## 2024-03-28 RX ORDER — KETOROLAC TROMETHAMINE 30 MG/ML
15 INJECTION, SOLUTION INTRAMUSCULAR; INTRAVENOUS EVERY 6 HOURS
Status: COMPLETED | OUTPATIENT
Start: 2024-03-28 | End: 2024-03-29

## 2024-03-28 RX ORDER — PROCHLORPERAZINE EDISYLATE 5 MG/ML
5 INJECTION INTRAMUSCULAR; INTRAVENOUS
Status: DISCONTINUED | OUTPATIENT
Start: 2024-03-28 | End: 2024-03-28 | Stop reason: HOSPADM

## 2024-03-28 RX ORDER — FENTANYL CITRATE 50 UG/ML
25 INJECTION, SOLUTION INTRAMUSCULAR; INTRAVENOUS EVERY 5 MIN PRN
Status: DISCONTINUED | OUTPATIENT
Start: 2024-03-28 | End: 2024-03-28 | Stop reason: HOSPADM

## 2024-03-28 RX ORDER — HYDRALAZINE HYDROCHLORIDE 20 MG/ML
10 INJECTION INTRAMUSCULAR; INTRAVENOUS
Status: DISCONTINUED | OUTPATIENT
Start: 2024-03-28 | End: 2024-03-29 | Stop reason: HOSPADM

## 2024-03-28 RX ORDER — SODIUM CHLORIDE 0.9 % (FLUSH) 0.9 %
5-40 SYRINGE (ML) INJECTION EVERY 12 HOURS SCHEDULED
Status: DISCONTINUED | OUTPATIENT
Start: 2024-03-28 | End: 2024-03-28 | Stop reason: HOSPADM

## 2024-03-28 RX ORDER — HYDROMORPHONE HYDROCHLORIDE 1 MG/ML
0.5 INJECTION, SOLUTION INTRAMUSCULAR; INTRAVENOUS; SUBCUTANEOUS EVERY 4 HOURS PRN
Status: DISCONTINUED | OUTPATIENT
Start: 2024-03-28 | End: 2024-03-29 | Stop reason: HOSPADM

## 2024-03-28 RX ORDER — CELECOXIB 200 MG/1
200 CAPSULE ORAL ONCE
Status: COMPLETED | OUTPATIENT
Start: 2024-03-28 | End: 2024-03-28

## 2024-03-28 RX ORDER — ACETAMINOPHEN 500 MG
1000 TABLET ORAL ONCE
Status: DISCONTINUED | OUTPATIENT
Start: 2024-03-28 | End: 2024-03-28 | Stop reason: SDUPTHER

## 2024-03-28 RX ORDER — FENTANYL CITRATE 50 UG/ML
100 INJECTION, SOLUTION INTRAMUSCULAR; INTRAVENOUS
Status: COMPLETED | OUTPATIENT
Start: 2024-03-28 | End: 2024-03-28

## 2024-03-28 RX ORDER — SODIUM CHLORIDE 9 MG/ML
INJECTION, SOLUTION INTRAVENOUS CONTINUOUS
Status: DISCONTINUED | OUTPATIENT
Start: 2024-03-28 | End: 2024-03-29 | Stop reason: HOSPADM

## 2024-03-28 RX ORDER — ACETAMINOPHEN 500 MG
1000 TABLET ORAL ONCE
Status: COMPLETED | OUTPATIENT
Start: 2024-03-28 | End: 2024-03-28

## 2024-03-28 RX ORDER — SODIUM CHLORIDE 0.9 % (FLUSH) 0.9 %
5-40 SYRINGE (ML) INJECTION PRN
Status: DISCONTINUED | OUTPATIENT
Start: 2024-03-28 | End: 2024-03-28 | Stop reason: HOSPADM

## 2024-03-28 RX ORDER — ACETAMINOPHEN 500 MG
500 TABLET ORAL
Status: DISCONTINUED | OUTPATIENT
Start: 2024-03-28 | End: 2024-03-29 | Stop reason: HOSPADM

## 2024-03-28 RX ORDER — HYDROXYZINE HYDROCHLORIDE 10 MG/1
10 TABLET, FILM COATED ORAL EVERY 8 HOURS PRN
Status: DISCONTINUED | OUTPATIENT
Start: 2024-03-28 | End: 2024-03-29 | Stop reason: HOSPADM

## 2024-03-28 RX ORDER — SODIUM CHLORIDE 9 MG/ML
INJECTION, SOLUTION INTRAVENOUS PRN
Status: DISCONTINUED | OUTPATIENT
Start: 2024-03-28 | End: 2024-03-28 | Stop reason: HOSPADM

## 2024-03-28 RX ORDER — ROPIVACAINE HYDROCHLORIDE 5 MG/ML
INJECTION, SOLUTION EPIDURAL; INFILTRATION; PERINEURAL
Status: COMPLETED | OUTPATIENT
Start: 2024-03-28 | End: 2024-03-28

## 2024-03-28 RX ORDER — ONDANSETRON 2 MG/ML
4 INJECTION INTRAMUSCULAR; INTRAVENOUS
Status: DISCONTINUED | OUTPATIENT
Start: 2024-03-28 | End: 2024-03-28 | Stop reason: HOSPADM

## 2024-03-28 RX ORDER — DIPHENHYDRAMINE HYDROCHLORIDE 50 MG/ML
12.5 INJECTION INTRAMUSCULAR; INTRAVENOUS
Status: ACTIVE | OUTPATIENT
Start: 2024-03-28 | End: 2024-03-29

## 2024-03-28 RX ORDER — HYDROMORPHONE HYDROCHLORIDE 1 MG/ML
0.5 INJECTION, SOLUTION INTRAMUSCULAR; INTRAVENOUS; SUBCUTANEOUS EVERY 5 MIN PRN
Status: DISCONTINUED | OUTPATIENT
Start: 2024-03-28 | End: 2024-03-28 | Stop reason: HOSPADM

## 2024-03-28 RX ORDER — OXYCODONE HYDROCHLORIDE 5 MG/1
5 TABLET ORAL
Status: DISCONTINUED | OUTPATIENT
Start: 2024-03-28 | End: 2024-03-29 | Stop reason: HOSPADM

## 2024-03-28 RX ORDER — SODIUM CHLORIDE 9 MG/ML
INJECTION, SOLUTION INTRAVENOUS PRN
Status: DISCONTINUED | OUTPATIENT
Start: 2024-03-28 | End: 2024-03-29 | Stop reason: HOSPADM

## 2024-03-28 RX ORDER — MIDAZOLAM HYDROCHLORIDE 2 MG/2ML
2 INJECTION, SOLUTION INTRAMUSCULAR; INTRAVENOUS
Status: COMPLETED | OUTPATIENT
Start: 2024-03-28 | End: 2024-03-28

## 2024-03-28 RX ORDER — ONDANSETRON 2 MG/ML
4 INJECTION INTRAMUSCULAR; INTRAVENOUS EVERY 6 HOURS PRN
Status: DISCONTINUED | OUTPATIENT
Start: 2024-03-28 | End: 2024-03-29 | Stop reason: HOSPADM

## 2024-03-28 RX ORDER — LOSARTAN POTASSIUM 50 MG/1
100 TABLET ORAL DAILY
Status: DISCONTINUED | OUTPATIENT
Start: 2024-03-29 | End: 2024-03-29 | Stop reason: HOSPADM

## 2024-03-28 RX ORDER — PANTOPRAZOLE SODIUM 40 MG/1
40 TABLET, DELAYED RELEASE ORAL
Status: DISCONTINUED | OUTPATIENT
Start: 2024-03-28 | End: 2024-03-29 | Stop reason: HOSPADM

## 2024-03-28 RX ORDER — ROSUVASTATIN CALCIUM 10 MG/1
10 TABLET, COATED ORAL DAILY
Status: DISCONTINUED | OUTPATIENT
Start: 2024-03-29 | End: 2024-03-29 | Stop reason: HOSPADM

## 2024-03-28 RX ORDER — ASPIRIN 81 MG/1
81 TABLET ORAL 2 TIMES DAILY
Status: DISCONTINUED | OUTPATIENT
Start: 2024-03-28 | End: 2024-03-29 | Stop reason: HOSPADM

## 2024-03-28 RX ORDER — PROPOFOL 10 MG/ML
INJECTION, EMULSION INTRAVENOUS CONTINUOUS PRN
Status: DISCONTINUED | OUTPATIENT
Start: 2024-03-28 | End: 2024-03-28 | Stop reason: SDUPTHER

## 2024-03-28 RX ORDER — SODIUM CHLORIDE 0.9 % (FLUSH) 0.9 %
5-40 SYRINGE (ML) INJECTION PRN
Status: DISCONTINUED | OUTPATIENT
Start: 2024-03-28 | End: 2024-03-29 | Stop reason: HOSPADM

## 2024-03-28 RX ORDER — OXYCODONE HYDROCHLORIDE 5 MG/1
5 TABLET ORAL
Status: DISCONTINUED | OUTPATIENT
Start: 2024-03-28 | End: 2024-03-28 | Stop reason: HOSPADM

## 2024-03-28 RX ORDER — LIDOCAINE HYDROCHLORIDE 10 MG/ML
1 INJECTION, SOLUTION EPIDURAL; INFILTRATION; INTRACAUDAL; PERINEURAL
Status: DISCONTINUED | OUTPATIENT
Start: 2024-03-28 | End: 2024-03-28 | Stop reason: HOSPADM

## 2024-03-28 RX ORDER — OXYCODONE HYDROCHLORIDE 5 MG/1
2.5 TABLET ORAL
Status: DISCONTINUED | OUTPATIENT
Start: 2024-03-28 | End: 2024-03-29 | Stop reason: HOSPADM

## 2024-03-28 RX ORDER — TADALAFIL 5 MG/1
5 TABLET ORAL PRN
Status: DISCONTINUED | OUTPATIENT
Start: 2024-03-28 | End: 2024-03-28 | Stop reason: RX

## 2024-03-28 RX ADMIN — TAMSULOSIN HYDROCHLORIDE 0.4 MG: 0.4 CAPSULE ORAL at 11:41

## 2024-03-28 RX ADMIN — WATER 2000 MG: 1 INJECTION INTRAMUSCULAR; INTRAVENOUS; SUBCUTANEOUS at 15:22

## 2024-03-28 RX ADMIN — ROPIVACAINE HYDROCHLORIDE 20 ML: 5 INJECTION, SOLUTION EPIDURAL; INFILTRATION; PERINEURAL at 07:20

## 2024-03-28 RX ADMIN — ACETAMINOPHEN 500 MG: 500 TABLET ORAL at 19:35

## 2024-03-28 RX ADMIN — MIDAZOLAM HYDROCHLORIDE 2 MG: 1 INJECTION, SOLUTION INTRAMUSCULAR; INTRAVENOUS at 07:18

## 2024-03-28 RX ADMIN — OXYCODONE 5 MG: 5 TABLET ORAL at 19:13

## 2024-03-28 RX ADMIN — ASPIRIN 81 MG: 81 TABLET, COATED ORAL at 22:07

## 2024-03-28 RX ADMIN — KETOROLAC TROMETHAMINE 15 MG: 30 INJECTION, SOLUTION INTRAMUSCULAR; INTRAVENOUS at 19:40

## 2024-03-28 RX ADMIN — ACETAMINOPHEN 1000 MG: 500 TABLET ORAL at 06:06

## 2024-03-28 RX ADMIN — OXYCODONE 5 MG: 5 TABLET ORAL at 15:22

## 2024-03-28 RX ADMIN — ACETAMINOPHEN 500 MG: 500 TABLET ORAL at 22:07

## 2024-03-28 RX ADMIN — SODIUM CHLORIDE, POTASSIUM CHLORIDE, SODIUM LACTATE AND CALCIUM CHLORIDE: 600; 310; 30; 20 INJECTION, SOLUTION INTRAVENOUS at 06:20

## 2024-03-28 RX ADMIN — ACETAMINOPHEN 500 MG: 500 TABLET ORAL at 15:22

## 2024-03-28 RX ADMIN — PROPOFOL 50 MCG/KG/MIN: 10 INJECTION, EMULSION INTRAVENOUS at 07:29

## 2024-03-28 RX ADMIN — SENNOSIDES AND DOCUSATE SODIUM 1 TABLET: 8.6; 5 TABLET ORAL at 22:07

## 2024-03-28 RX ADMIN — CELECOXIB 200 MG: 200 CAPSULE ORAL at 06:06

## 2024-03-28 RX ADMIN — PANTOPRAZOLE SODIUM 40 MG: 40 TABLET, DELAYED RELEASE ORAL at 11:43

## 2024-03-28 RX ADMIN — FENTANYL CITRATE 100 MCG: 50 INJECTION INTRAMUSCULAR; INTRAVENOUS at 07:18

## 2024-03-28 RX ADMIN — OXYCODONE 5 MG: 5 TABLET ORAL at 11:40

## 2024-03-28 RX ADMIN — KETOROLAC TROMETHAMINE 15 MG: 30 INJECTION, SOLUTION INTRAMUSCULAR; INTRAVENOUS at 22:06

## 2024-03-28 RX ADMIN — WATER 2000 MG: 1 INJECTION INTRAMUSCULAR; INTRAVENOUS; SUBCUTANEOUS at 07:40

## 2024-03-28 RX ADMIN — ONDANSETRON 4 MG: 2 INJECTION INTRAMUSCULAR; INTRAVENOUS at 09:45

## 2024-03-28 RX ADMIN — ASPIRIN 81 MG: 81 TABLET, COATED ORAL at 11:41

## 2024-03-28 RX ADMIN — SODIUM CHLORIDE: 9 INJECTION, SOLUTION INTRAVENOUS at 11:43

## 2024-03-28 RX ADMIN — SENNOSIDES AND DOCUSATE SODIUM 1 TABLET: 8.6; 5 TABLET ORAL at 11:41

## 2024-03-28 RX ADMIN — MEPIVACAINE HYDROCHLORIDE 60 MG: 15 INJECTION, SOLUTION EPIDURAL; INFILTRATION at 07:36

## 2024-03-28 RX ADMIN — PROPOFOL 50 MG: 10 INJECTION, EMULSION INTRAVENOUS at 07:30

## 2024-03-28 RX ADMIN — TRANEXAMIC ACID 1000 MG: 100 INJECTION, SOLUTION INTRAVENOUS at 07:40

## 2024-03-28 RX ADMIN — SODIUM CHLORIDE: 9 INJECTION, SOLUTION INTRAVENOUS at 09:45

## 2024-03-28 RX ADMIN — OXYCODONE 5 MG: 5 TABLET ORAL at 22:11

## 2024-03-28 RX ADMIN — ACETAMINOPHEN 500 MG: 500 TABLET ORAL at 11:41

## 2024-03-28 ASSESSMENT — PAIN DESCRIPTION - DESCRIPTORS
DESCRIPTORS: ACHING
DESCRIPTORS: ACHING;DISCOMFORT;DULL
DESCRIPTORS: DISCOMFORT

## 2024-03-28 ASSESSMENT — PAIN SCALES - GENERAL
PAINLEVEL_OUTOF10: 5
PAINLEVEL_OUTOF10: 6
PAINLEVEL_OUTOF10: 6

## 2024-03-28 ASSESSMENT — PAIN DESCRIPTION - LOCATION
LOCATION: KNEE

## 2024-03-28 ASSESSMENT — PAIN DESCRIPTION - ORIENTATION
ORIENTATION: LEFT

## 2024-03-28 ASSESSMENT — PAIN DESCRIPTION - PAIN TYPE
TYPE: ACUTE PAIN;SURGICAL PAIN
TYPE: SURGICAL PAIN

## 2024-03-28 ASSESSMENT — PAIN DESCRIPTION - ONSET: ONSET: ON-GOING

## 2024-03-28 ASSESSMENT — PAIN - FUNCTIONAL ASSESSMENT
PAIN_FUNCTIONAL_ASSESSMENT: ACTIVITIES ARE NOT PREVENTED
PAIN_FUNCTIONAL_ASSESSMENT: ACTIVITIES ARE NOT PREVENTED
PAIN_FUNCTIONAL_ASSESSMENT: 0-10

## 2024-03-28 ASSESSMENT — PAIN DESCRIPTION - FREQUENCY: FREQUENCY: CONTINUOUS

## 2024-03-28 NOTE — ANESTHESIA PRE PROCEDURE
Department of Anesthesiology  Preprocedure Note       Name:  Yasmany Zamora III   Age:  68 y.o.  :  1955                                          MRN:  [id number]         Date:  3/28/2024      Surgeon: Surgeon(s):  Florentino Andujar MD    Procedure: Procedure(s):  LEFT TOTAL KNEE ARTHROPLASTY (SPINAL W/ BLOCK)    Medications prior to admission:   Prior to Admission medications    Medication Sig Start Date End Date Taking? Authorizing Provider   triamcinolone (ARISTOCORT) 0.5 % cream Apply topically 2 times daily for 2 weeks to affected area 3/22/24 3/29/24  Martine Canales APRN - CNP   meloxicam (MOBIC) 15 MG tablet Take 1 tablet by mouth daily    ProviderBreezy MD   acetaminophen (TYLENOL) 500 MG tablet Take 1 tablet by mouth every 6 hours as needed for Pain    ProviderBreezy MD   omeprazole (PRILOSEC) 20 MG delayed release capsule Take 1 capsule by mouth daily    ProviderBreezy MD   olmesartan (BENICAR) 40 MG tablet Take 1 tablet by mouth daily    ProviderBreezy MD   amoxicillin-clavulanate (AUGMENTIN) 500-125 MG per tablet Take 1 tablet by mouth 2 times daily TAKES FOR ADULT ACNE IN CHIN AND SCALP 21   Automatic Reconciliation, Ar   rosuvastatin (CRESTOR) 10 MG tablet Take 1 tablet by mouth daily 19   Automatic Reconciliation, Ar   tadalafil (CIALIS) 5 MG tablet Take 1 tablet by mouth as needed (FREQUENT URINATION) 19   Automatic Reconciliation, Ar       Current medications:    Current Facility-Administered Medications   Medication Dose Route Frequency Provider Last Rate Last Admin    tranexamic acid (CYKLOKAPRON) 1,000 mg in sodium chloride 0.9 % 110 mL IVPB (mini-bag)  1,000 mg IntraVENous On Call to OR Yin Allen PA-C        sodium chloride flush 0.9 % injection 5-40 mL  5-40 mL IntraVENous 2 times per day Yin Allen PA-C        sodium chloride flush 0.9 % injection 5-40 mL  5-40 mL IntraVENous PRN Yin Allen PA-C        0.9

## 2024-03-28 NOTE — PERIOP NOTE
TRANSFER - OUT REPORT:    Verbal report given to SHARA Mitchell on Yasmany Zamora III  being transferred to [address] [place] [address] for routine post-op       Report consisted of patient's Situation, Background, Assessment and   Recommendations(SBAR).     Information from the following report(s) Nurse Handoff Report, Adult Overview, Surgery Report, Intake/Output, MAR, and Recent Results was reviewed with the receiving nurse.           Lines:   Peripheral IV 03/28/24 Left Forearm (Active)   Site Assessment Clean, dry & intact 03/28/24 0955   Line Status Infusing 03/28/24 0955   Line Care Connections checked and tightened 03/28/24 0955   Phlebitis Assessment No symptoms 03/28/24 0955   Infiltration Assessment 0 03/28/24 0955   Alcohol Cap Used Yes 03/28/24 0955   Dressing Status Clean, dry & intact 03/28/24 0955   Dressing Type Transparent 03/28/24 0955        Opportunity for questions and clarification was provided.

## 2024-03-28 NOTE — ANESTHESIA POSTPROCEDURE EVALUATION
Department of Anesthesiology  Postprocedure Note    Patient: Yasmany Zamora III  MRN: [id number]  YOB: 1955  Date of evaluation: 3/28/2024    Procedure Summary       Date: 03/28/24 Room / Location: [facility] MAIN OR [address] [place] [address] MAIN OR    Anesthesia Start: 0715 Anesthesia Stop: 0957    Procedure: LEFT TOTAL KNEE ARTHROPLASTY (SPINAL W/ BLOCK) (Left: Knee) Diagnosis:       Degenerative arthritis of left knee      (Degenerative arthritis of left knee [M17.12])    Providers: Florentino Andujar MD Responsible Provider: Chloe Dial DO    Anesthesia Type: MAC, Regional ASA Status: 2            Anesthesia Type: MAC, Regional    Kierra Phase I: Kierra Score: 10    Kierra Phase II:      Anesthesia Post Evaluation    Patient location during evaluation: PACU  Patient participation: complete - patient participated  Level of consciousness: awake and alert  Pain score: 0  Airway patency: patent  Nausea & Vomiting: no nausea and no vomiting  Cardiovascular status: hemodynamically stable  Respiratory status: acceptable  Hydration status: euvolemic  Pain management: adequate    No notable events documented.

## 2024-03-28 NOTE — DISCHARGE INSTRUCTIONS
Post-op Discharge Instructions Following Total Joint Replacement  Florentino Andujar MD  [facility]  [phone]  Follow-up Office Visit  See Dr. Andujar approximately 3-4 weeks from date of surgery. Call [phone] to make an appointment.  If you have any postop questions for Dr. Andujar's clinical team, please call [phone].  Activity  Use your walker for ambulation.  Weight bearing as tolerated unless instructed otherwise by the physical therapist. Get up every hour you are awake and take a brief walk. Lengthen walking distance daily as your strength improves.  Continue using your walker until seen in the office for your first follow up visit.  Practice your exercises 3 times daily as instructed by the physical therapist. Ice for 20 minutes after exercising.  No driving until seen in the office for your first follow up visit.  Incision Care  The light brown Aquacel surgical dressing is waterproof and is to remain on your incision for 7 days. On the 7th day, carefully lift the edge of the dressing to break the adhesive seal and gently peel it off.  If your Aquacel dressings comes loose or falls off before the 7th day, replace it with a dry sterile gauze dressing and change this dressing daily. Once there is no drainage on the bandage, you mean leave the incision open to air.  You may take a shower with the Aquacel dressing in place. After you remove the Aquacel dressing on day 7, you may continue to shower and get your incision wet in the shower. Do not submerge your incision under water in a bathtub, hot tub, swimming pool, etc. until after you have been evaluated at your first office visit.  Medications  Blood Clot Prevention: Take medication as prescribed by your physician for 4 weeks postop.  Pain Management: Take pain medication as prescribed; wean yourself off of pain medication as your pain lessens. Take with food.  You make also take Tylenol every 4-6 hours as needed for pain.  Do not exceed 3  grams (3000mg) per day.  Place an ice bag on or around the incision for 20 minutes on / 20 minutes off as needed throughout the day and night, especially after exercising.  Stool Softener: You may want to take a stool softener (such as Senokot-S or Colace) to prevent constipation while taking pain medication. If constipation occurs, you may also use a laxative (such as Dulcolax tablets, Miralax, or a suppository).  Diet  Resume usual diet at home. Drink plenty of fluids. Eat foods high in fiber and protein. Calcium and Vitamin D supplements recommended. Avoid alcoholic beverages. No smoking.    When to call your Orthopaedic Surgeon: If you call after 5pm or on a weekend, the on call physician will return your call  Pain that is not relieved by pain medication, ice, and activity modification  Signs of infection (red incision, continuous drainage from the incision, malodorous drainage, persistent fever greater than 101 degrees Fahrenheit)  Signs of a blood clot in your leg (calf pain, tenderness, redness, and/or swelling of the lower leg)  ?  When to call your Primary Care Physician  Concerns about your medical conditions such as diabetes, high blood pressure, asthma, congestive heart failure  Call if blood sugars are elevated, if you have a persistent headache or dizziness, coughing or congestion, constipation or diarrhea, burning with urination, abnormal heart rate (fast or slow)  When to call 911 and go to the nearest Emergency Room  Acute onset of chest pain, shortness of breath, difficulty breathing

## 2024-03-28 NOTE — ANESTHESIA PROCEDURE NOTES
Spinal Block    Patient location during procedure: OR  Reason for block: primary anesthetic  Staffing  Performed: resident/CRNA   Resident/CRNA: Caesar Cruz APRN - MICK  Performed by: Caesar Cruz APRN - CRNA  Authorized by: Alexandro Whiting MD    Spinal Block  Patient position: sitting  Prep: Betadine  Patient monitoring: continuous pulse ox, continuous capnometry, frequent blood pressure checks and oxygen  Approach: midline  Location: L4/L5  Provider prep: mask and sterile gloves  Needle  Needle type: Sprotte Tip   Needle gauge: 25 G  Needle length: 3.5 in  Assessment  Swirl obtained: Yes  CSF: clear  Attempts: 1  Hemodynamics: stable  Preanesthetic Checklist  Completed: patient identified, IV checked, site marked, risks and benefits discussed, surgical/procedural consents, equipment checked, pre-op evaluation, timeout performed, anesthesia consent given, oxygen available, monitors applied/VS acknowledged, fire risk safety assessment completed and verbalized and blood product R/B/A discussed and consented

## 2024-03-28 NOTE — ANESTHESIA PROCEDURE NOTES
Peripheral Block    Patient location during procedure: pre-op  Reason for block: post-op pain management and at surgeon's request  Start time: 3/28/2024 7:15 AM  End time: 3/28/2024 7:20 AM  Staffing  Performed: anesthesiologist   Anesthesiologist: Garfield Tanner MD  Performed by: Alexandro Whiting MD  Authorized by: Alexandro Whiting MD    Preanesthetic Checklist  Completed: patient identified, IV checked, site marked, risks and benefits discussed, surgical/procedural consents, equipment checked, pre-op evaluation, timeout performed, anesthesia consent given, oxygen available, monitors applied/VS acknowledged and fire risk safety assessment completed and verbalized  Peripheral Block   Patient position: supine  Prep: ChloraPrep  Provider prep: mask and sterile gloves  Patient monitoring: cardiac monitor, continuous pulse ox, frequent blood pressure checks, IV access and oxygen  Block type: Saphenous  Laterality: left  Injection technique: single-shot  Guidance: ultrasound guided  Local infiltration: ropivacaine  Infiltration strength: 0.5 %  Local infiltration: ropivacaineDose: 30 mL    Needle   Needle type: insulated echogenic nerve stimulator needle   Needle gauge: 21 G  Needle localization: anatomical landmarks and ultrasound guidance  Needle length: 10 cm  Assessment   Injection assessment: negative aspiration for heme, no paresthesia on injection, local visualized surrounding nerve on ultrasound and no intravascular symptoms  Paresthesia pain: none  Slow fractionated injection: yes  Hemodynamics: stable  Outcomes: uncomplicated and patient tolerated procedure well    Medications Administered  ropivacaine (NAROPIN) injection 0.5% - Perineural   20 mL - 3/28/2024 7:20:00 AM

## 2024-03-28 NOTE — PLAN OF CARE
tolerance, decline in functional mobility and impaired standing balance/gait with RW.  Pt mobilized easily with no c/o dizziness or nausea.  Anticipate pt will be able to increase amb distance, advance exercise and perform stairs during am session on 3/29 and be cleared for early discharge from PT standpoint.        Reviewed and performed supine TKA exercise.   Reviewed and instructed in proper positioning to avoid external rotation and knee flexion in supine or recliner position - using blanket roll to support leg in neutral position.  Reviewed importance of not placing pillow under knee to position knee in prolonged flexion and  continued use of ice - have staff replace frequently.        Patient will benefit from skilled intervention to address the above impairments.    Functional Outcome Measure:  The patient scored 21/24 on the AM-PAC outcome measure which is indicative of a Cutoff score ?171,2,3 had higher odds of discharging home with home health or need of SNF/IPR.      Pt educated on fall prevention and safety in hospital - Instructed to utilize call button and wait for assistance prior to attempting OOB.      PLAN :  Recommendations and Planned Interventions:   bed mobility training, transfer training, gait training, therapeutic exercises, patient and family training/education, and therapeutic activities    Frequency/Duration: Patient will be followed by physical therapy to address goals, PT Plan of Care: BID to address goals.    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

## 2024-03-28 NOTE — PROGRESS NOTES
Ortho NP Note    POD# 0  s/p LEFT TOTAL KNEE ARTHROPLASTY (SPINAL W/ BLOCK)   Pt seen with wife at bedside.     Pt recently arrived to ortho unit.   Awake and alert, in NAD.   Denies postop pain, aware of prn oxycodone   BLE numbness resolved s/p spinal block   Denies nausea.   No complaints or concerns at this time     VSS Afebrile.    Visit Vitals  /81   Pulse 60   Temp 97.4 °F (36.3 °C) (Oral)   Resp 14   SpO2 95%       Voiding status: due to void          Labs    Lab Results   Component Value Date/Time    HGB 14.6 03/14/2024 11:21 AM      Lab Results   Component Value Date/Time    INR 1.0 03/14/2024 11:21 AM      Lab Results   Component Value Date/Time     03/14/2024 11:21 AM    K 4.7 03/14/2024 11:21 AM     03/14/2024 11:21 AM    CO2 28 03/14/2024 11:21 AM    BUN 16 03/14/2024 11:21 AM     Recent Glucose Results:   Glucose   Date Value Ref Range Status   03/14/2024 98 65 - 100 mg/dL Final   09/29/2022 104 (H) 65 - 100 mg/dL Final   09/28/2021 105 (H) 65 - 100 mg/dL Final           There is no height or weight on file to calculate BMI. : A BMI > 30 is classified as obesity and > 40 is classified as morbid obesity.       Ace wrap dressing c.d.i  Cryotherapy in place over incision  Calves soft and supple; No pain with passive stretch  Sensation and motor intact. +PF/DF/EHL intact 5/5  SCDs for mechanical DVT proph while in bed     PLAN:  1) PT BID - WBAT  2) Aspirin 81 mg PO BID for DVT Prophylaxis. Encouraged early mobilization, bed exercises, and SCD use.  3) GI Prophylaxis - Protonix  4) Pain control - scheduled tylenol  and toradol, and prn  oxycodone  .  5) Post op care - Continue bowel regimen, encouraged IS.  Aquacel to remain in place x 7 days unless integrity is lost.   6) Hx BPH - Followed OP by [facility]. Flomax ordered BID with hold parameters SBP < 120  7) Readniess for discharge:     [x] Vital Signs stable    [] Hgb stable    [] + Voiding    [x] Wound intact, drainage minimal     [x] Tolerating PO intake     [] Cleared by PT (OT if applicable)     [] Stair training completed (if applicable)    [] Independent / Contact Guard Assist (household distance)     [] Bed mobility     [] Car transfers     [] ADL’s    [x] Adequate pain control on oral medication alone         Cheryl Campbell, APRN - NP

## 2024-03-28 NOTE — BRIEF OP NOTE
Brief Postoperative Note      Patient: Yasmany Zamora III  YOB: 1955  MRN: [id number]    Date of Procedure: 3/28/2024    Pre-Op Diagnosis Codes:     * Degenerative arthritis of left knee [M17.12]    Post-Op Diagnosis: Same       Procedure(s):  LEFT TOTAL KNEE ARTHROPLASTY (SPINAL W/ BLOCK)    Surgeon(s):  Florentino Andujar MD    Assistant:  Surgical Assistant: Amina Edmondson  Physician Assistant: Yin Allen PA-C    Anesthesia: Spinal    Estimated Blood Loss (mL): 200     Complications: None    Specimens:   * No specimens in log *    Implants:  Implant Name Type Inv. Item Serial No.  Lot No. LRB No. Used Action   CEMENT BNE MED VISC 80 GM GENTAMICIN PALACOS MV+G PRO - SNA  CEMENT BNE MED VISC 80 GM GENTAMICIN PALACOS MV+G PRO NA [employer] [facility] [facility] [place] [id number] Left 1 Implanted   INSERT TIB SZ 10 [id number] LT KNEE EMPOWR 3D E + - SNA  INSERT TIB SZ 10 [id number] LT KNEE EMPOWR 3D E + NA [employer] [facility] [id number] Left 1 Implanted   COMPONENT FEM SZ 9 LT KNEE NP EMPOWR 3D - SNA  COMPONENT FEM SZ 9 LT KNEE NP EMPOWR 3D NA [facility] [id number] Left 1 Implanted   COMPONENT PATELLAR 3 PEG 32X8 MM KNEE DOMED POLYETH E-PLUS - SNA  COMPONENT PATELLAR 3 PEG 32X8 MM KNEE DOMED POLYETH E-PLUS NA [facility] [employer] [facility] [id number] Left 1 Implanted   BASEPLATE TIB SZ 10 LT NP STEMMABLE EMPOWR - SNA  BASEPLATE TIB SZ 10 LT NP STEMMABLE EMPOWR NA [facility] [employer] [facility] [id number] Left 1 Implanted         Drains: * No LDAs found *    Findings: left knee oa      Electronically signed by Yin Allen PA-C on 3/28/2024 at 11:27 AM

## 2024-03-29 VITALS
RESPIRATION RATE: 16 BRPM | SYSTOLIC BLOOD PRESSURE: 132 MMHG | OXYGEN SATURATION: 96 % | HEART RATE: 80 BPM | DIASTOLIC BLOOD PRESSURE: 79 MMHG | TEMPERATURE: 98.8 F

## 2024-03-29 LAB
ANION GAP SERPL CALC-SCNC: 5 MMOL/L (ref 5–15)
BUN SERPL-MCNC: 18 MG/DL (ref 6–20)
BUN/CREAT SERPL: 21 (ref 12–20)
CALCIUM SERPL-MCNC: 8.7 MG/DL (ref 8.5–10.1)
CHLORIDE SERPL-SCNC: 118 MMOL/L (ref 97–108)
CO2 SERPL-SCNC: 24 MMOL/L (ref 21–32)
CREAT SERPL-MCNC: 0.84 MG/DL (ref 0.7–1.3)
GLUCOSE SERPL-MCNC: 113 MG/DL (ref 65–100)
HCT VFR BLD AUTO: 27.7 % (ref 36.6–50.3)
HGB BLD-MCNC: 9.3 G/DL (ref 12.1–17)
POTASSIUM SERPL-SCNC: 4.1 MMOL/L (ref 3.5–5.1)
SODIUM SERPL-SCNC: 147 MMOL/L (ref 136–145)

## 2024-03-29 PROCEDURE — 36415 COLL VENOUS BLD VENIPUNCTURE: CPT

## 2024-03-29 PROCEDURE — APPNB60 APP NON BILLABLE TIME 46-60 MINS: Performed by: NURSE PRACTITIONER

## 2024-03-29 PROCEDURE — 85014 HEMATOCRIT: CPT

## 2024-03-29 PROCEDURE — 97116 GAIT TRAINING THERAPY: CPT

## 2024-03-29 PROCEDURE — 51798 US URINE CAPACITY MEASURE: CPT

## 2024-03-29 PROCEDURE — 80048 BASIC METABOLIC PNL TOTAL CA: CPT

## 2024-03-29 PROCEDURE — 97110 THERAPEUTIC EXERCISES: CPT

## 2024-03-29 PROCEDURE — 2580000003 HC RX 258: Performed by: PHYSICIAN ASSISTANT

## 2024-03-29 PROCEDURE — 96376 TX/PRO/DX INJ SAME DRUG ADON: CPT

## 2024-03-29 PROCEDURE — 85018 HEMOGLOBIN: CPT

## 2024-03-29 PROCEDURE — 6370000000 HC RX 637 (ALT 250 FOR IP): Performed by: PHYSICIAN ASSISTANT

## 2024-03-29 PROCEDURE — 6360000002 HC RX W HCPCS: Performed by: PHYSICIAN ASSISTANT

## 2024-03-29 PROCEDURE — G0378 HOSPITAL OBSERVATION PER HR: HCPCS

## 2024-03-29 PROCEDURE — 97530 THERAPEUTIC ACTIVITIES: CPT

## 2024-03-29 PROCEDURE — 96374 THER/PROPH/DIAG INJ IV PUSH: CPT

## 2024-03-29 RX ORDER — ASPIRIN 81 MG/1
81 TABLET ORAL 2 TIMES DAILY
Qty: 60 TABLET | Refills: 0 | Status: SHIPPED | OUTPATIENT
Start: 2024-03-29 | End: 2024-04-28

## 2024-03-29 RX ORDER — OXYCODONE HYDROCHLORIDE 5 MG/1
2.5-5 TABLET ORAL EVERY 4 HOURS PRN
Qty: 42 TABLET | Refills: 0 | Status: SHIPPED | OUTPATIENT
Start: 2024-03-29 | End: 2024-04-05

## 2024-03-29 RX ORDER — SENNA AND DOCUSATE SODIUM 50; 8.6 MG/1; MG/1
1 TABLET, FILM COATED ORAL 2 TIMES DAILY
Qty: 14 TABLET | Refills: 0 | Status: SHIPPED | OUTPATIENT
Start: 2024-03-29 | End: 2024-04-05

## 2024-03-29 RX ADMIN — PANTOPRAZOLE SODIUM 40 MG: 40 TABLET, DELAYED RELEASE ORAL at 05:51

## 2024-03-29 RX ADMIN — ACETAMINOPHEN 500 MG: 500 TABLET ORAL at 09:55

## 2024-03-29 RX ADMIN — OXYCODONE 5 MG: 5 TABLET ORAL at 05:51

## 2024-03-29 RX ADMIN — KETOROLAC TROMETHAMINE 15 MG: 30 INJECTION, SOLUTION INTRAMUSCULAR; INTRAVENOUS at 05:50

## 2024-03-29 RX ADMIN — SODIUM CHLORIDE, PRESERVATIVE FREE 10 ML: 5 INJECTION INTRAVENOUS at 08:19

## 2024-03-29 RX ADMIN — OXYCODONE 5 MG: 5 TABLET ORAL at 13:08

## 2024-03-29 RX ADMIN — OXYCODONE 5 MG: 5 TABLET ORAL at 09:55

## 2024-03-29 RX ADMIN — SODIUM CHLORIDE: 9 INJECTION, SOLUTION INTRAVENOUS at 00:48

## 2024-03-29 RX ADMIN — ACETAMINOPHEN 500 MG: 500 TABLET ORAL at 13:08

## 2024-03-29 RX ADMIN — ROSUVASTATIN 10 MG: 10 TABLET, FILM COATED ORAL at 08:19

## 2024-03-29 RX ADMIN — SODIUM CHLORIDE: 9 INJECTION, SOLUTION INTRAVENOUS at 06:28

## 2024-03-29 RX ADMIN — OXYCODONE 5 MG: 5 TABLET ORAL at 02:06

## 2024-03-29 RX ADMIN — WATER 2000 MG: 1 INJECTION INTRAMUSCULAR; INTRAVENOUS; SUBCUTANEOUS at 00:30

## 2024-03-29 RX ADMIN — SENNOSIDES AND DOCUSATE SODIUM 1 TABLET: 8.6; 5 TABLET ORAL at 08:18

## 2024-03-29 RX ADMIN — KETOROLAC TROMETHAMINE 15 MG: 30 INJECTION, SOLUTION INTRAMUSCULAR; INTRAVENOUS at 12:14

## 2024-03-29 RX ADMIN — LOSARTAN POTASSIUM 100 MG: 50 TABLET, FILM COATED ORAL at 08:18

## 2024-03-29 RX ADMIN — ASPIRIN 81 MG: 81 TABLET, COATED ORAL at 08:18

## 2024-03-29 RX ADMIN — ACETAMINOPHEN 500 MG: 500 TABLET ORAL at 05:50

## 2024-03-29 ASSESSMENT — PAIN DESCRIPTION - DESCRIPTORS: DESCRIPTORS: ACHING

## 2024-03-29 ASSESSMENT — PAIN DESCRIPTION - ORIENTATION: ORIENTATION: LEFT

## 2024-03-29 ASSESSMENT — PAIN SCALES - GENERAL: PAINLEVEL_OUTOF10: 7

## 2024-03-29 ASSESSMENT — PAIN DESCRIPTION - LOCATION: LOCATION: KNEE

## 2024-03-29 NOTE — CARE COORDINATION
Care Management Initial Assessment       RUR: N/A  Readmission? No  1st IM letter given? No  1st  letter given: No    CM et with patient at bedside to introduce self and explain role. Patient states he is independent with ADLs prior to admission. Patient owns a RW and cane. Patient lives with his spouse in a 1 level house with 4 steps to enter- no railing. Confirms family will transport patient home at discharge.  referral sent to [facility].     [facility] accepted, CM added to AVS.      03/29/24 1113   Service Assessment   Patient Orientation Alert and Oriented;Person;Place;Situation;Self   Cognition Alert   History Provided By Patient   Primary Caregiver Self   Support Systems Spouse/Significant Other   Patient's Healthcare Decision Maker is: Named in Scanned ACP Document   PCP Verified by CM Yes   Last Visit to PCP Within last 3 months   Prior Functional Level Independent in ADLs/IADLs   Can patient return to prior living arrangement Yes   Ability to make needs known: Good   Family able to assist with home care needs: Yes   Financial Resources Medicare   Social/Functional History   Lives With Spouse   Type of Home House   Home Layout One level   Home Access Stairs to enter without rails   Entrance Stairs - Number of Steps 4   Home Equipment Cane;Walker, rolling   Discharge Planning   Type of Residence House   Living Arrangements Spouse/Significant Other   Services At/After Discharge   Services At/After Discharge Home Health   Condition of Participation: Discharge Planning   The Plan for Transition of Care is related to the following treatment goals: home health   The Patient and/or Patient Representative was provided with a Choice of Provider? Patient   The Patient and/Or Patient Representative agree with the Discharge Plan? Yes   Freedom of Choice list was provided with basic dialogue that supports the patient's individualized plan of care/goals, treatment preferences, and shares the quality data

## 2024-03-29 NOTE — PLAN OF CARE
Problem: Physical Therapy - Adult  Goal: By Discharge: Performs mobility at highest level of function for planned discharge setting.  See evaluation for individualized goals.  Description: FUNCTIONAL STATUS PRIOR TO ADMISSION: Patient was independent without use of DME.  Pt limited by pain for sustained standing.  Pt attended Joint class and read educational materials.    HOME SUPPORT PRIOR TO ADMISSION: The patient lived with wife but did not require assistance.    Physical Therapy Goals  Initiated 3/28/2024  1.  Patient will move from supine to sit and sit to supine and scoot up and down in bed with modified independence within 4 day(s).    2.  Patient will perform sit to stand with modified independence within 4 day(s).  3.  Patient will transfer from bed to chair and chair to bed with modified independence using the least restrictive device within 4 day(s).  4.  Patient will ambulate with modified independence for > 150 feet with the least restrictive device within 4 day(s).   5.  Patient will ascend/descend 4 stairs with one handrail(s) with supervision within 4 day(s).  6. Patient will perform TKR home exercise program per protocol with supervision/set-up within 4 days.  7. Patient will demonstrate AROM 0-90 degrees in operative joint within 4 days.     Outcome: Adequate for Discharge   PHYSICAL THERAPY TREATMENT    Patient: Yasmany Zamora III (68 y.o. male)  Date: 3/29/2024  Diagnosis: Degenerative arthritis of left knee [M17.12]  Primary osteoarthritis of left knee [M17.12] Degenerative arthritis of left knee  Procedure(s) (LRB):  LEFT TOTAL KNEE ARTHROPLASTY (SPINAL W/ BLOCK) (Left) 1 Day Post-Op  Precautions: Weight Bearing, Fall Risk   Left Lower Extremity Weight Bearing: Weight Bearing As Tolerated                  ASSESSMENT:  Patient continues to benefit from skilled PT services and has adequately met goals to promote safe discharge plan. Pt's wife present and participated in care.  Pt able to  Level of Assistance: Contact-guard assistance  Number of Stairs Trained: 4          Pain Rating:  Left knee 3/10 rest; increased to 7 with weight bearing; after session with elevation/ice 5/10    Pain Intervention(s):   ice, rest, repositioning, and pain is at a level acceptable to the patient    Activity Tolerance:   Good - tolerating household distances with RW    After treatment:   Patient left in no apparent distress sitting up in chair, Call bell within reach, and Caregiver / family present      COMMUNICATION/EDUCATION:   The patient's plan of care was discussed with: registered nurse, , and NP - Cheryl Campbell.    Patient Education  Education Provided: Family Education;Fall Prevention Strategies  Education Provided Comments: Stair Training  Education Method: Demonstration;Verbal;Teach Back  Barriers to Learning: None  Education Outcome: Verbalized understanding;Continued education needed;Demonstrated understanding      Katelynn Johnson, PT  Minutes: 70

## 2024-03-29 NOTE — OP NOTE
[address]  [address]                            OPERATIVE REPORT      PATIENT NAME: GEORGI VALLEJO            : 1955  MED REC NO: [id number]                       ROOM: 554  ACCOUNT NO: [id number]                       ADMIT DATE: 2024  PROVIDER: Florentino Andujar MD    DATE OF SERVICE:  2024    PREOPERATIVE DIAGNOSES:  Osteoarthritis, left knee.    POSTOPERATIVE DIAGNOSES:  Osteoarthritis, left knee.    PROCEDURES PERFORMED:  Left total knee arthroplasty.    SURGEON:  Florentino Andujar MD    ASSISTANT:  First assistant, Yin Allen PA-C.    ANESTHESIA:  Spinal sedation as well as adductor canal block.    ESTIMATED BLOOD LOSS:  200 mL.    SPECIMENS REMOVED:  None.    INTRAOPERATIVE FINDINGS:  ***     COMPLICATIONS:  None.    IMPLANTS:  Don[employer] Empower 3D size 9 femur, size 10 tibial tray with 16 mm polyethylene insert and 32 mm patella.    INDICATIONS:  The patient is a 68-year-old male with progressive left knee pain due to severe erosive osteoarthritis.  Symptoms have progressed despite comprehensive conservative treatment.  He presents for left total knee replacement.  Risks, benefits, and alternatives of procedure were reviewed with him in detail and he desires to proceed.    DESCRIPTION OF PROCEDURE:  Anesthesia team performed an adductor canal block in the left thigh before taking the patient to the operating room, and they also placed a spinal.  Preoperative IV antibiotics were administered.  A padded pneumatic tourniquet was placed around the left upper thigh.  Left lower extremity was prepped and draped in usual sterile fashion.  Tourniquet was inflated to 275.  Through a midline anterior knee incision, I performed a medial parapatellar arthrotomy.  Progressive medial release was performed to facilitate exposure and soft tissue balance throughout the procedure.  As soon as the knee was flexed, the tourniquet was

## 2024-03-29 NOTE — DISCHARGE SUMMARY
Ortho Discharge Summary    Patient ID:  Yasmany Zamora III  [id number]  male  68 y.o.  1955    Admit date: 3/28/2024    Discharge date: 3/29/2024    Admitting Physician: Florentino Andujar MD     Consulting Physician(s):   Treatment Team: Attending Provider: Florentino Andujar MD; Surgeon: Florentino Andujar MD; Physical Therapist: Katelynn Johnson PT; : Dayan Griffin    Date of Surgery:   3/28/2024     Preoperative Diagnosis:  Degenerative arthritis of left knee [M17.12]    Postoperative Diagnosis:   * No post-op diagnosis entered *    Procedure(s):   LEFT TOTAL KNEE ARTHROPLASTY (SPINAL W/ BLOCK)     Anesthesia Type:   Spinal     Surgeon: Florentino Andujar MD                            HPI:  Pt is a 68 y.o. male who has a history of Degenerative arthritis of left knee [M17.12]  with pain and limitations of activities of daily living who presents at this time for a LEFT TOTAL KNEE ARTHROPLASTY (SPINAL W/ BLOCK) following the failure of conservative management.    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 : A BMI > 30 is classified as obesity and > 40 is classified as morbid obesity.     Medications upon admission :   Prior to Admission Medications   Prescriptions Last Dose Informant Patient Reported? Taking?   acetaminophen (TYLENOL) 500 MG tablet 3/28/2024  Yes No   Sig: Take 1 tablet by mouth every 6 hours as needed for Pain   amoxicillin-clavulanate (AUGMENTIN) 500-125 MG per tablet 3/27/2024 at 0800  Yes No   Sig: Take 1 tablet by mouth 2 times daily TAKES FOR ADULT ACNE IN CHIN AND SCALP   meloxicam (MOBIC) 15 MG tablet 3/27/2024  Yes No   Sig: Take 1 tablet by mouth daily   olmesartan (BENICAR) 40 MG tablet 3/28/2024 at 0500  Yes No   Sig: Take 1 tablet by mouth daily   omeprazole (PRILOSEC) 20 MG delayed release capsule

## 2024-03-29 NOTE — PROGRESS NOTES
Chart reviewed, pt seen - pt able to advance to seated TKR exercise.  Amb distance increased with supervision only and performed stairs with rail and cane with CGA only.     Pt cleared for discharge.  Case Management, RN and NP - Cheryl Campbell notified.     Pt viewed discharge video.     Full note to follow -      Katelynn Johnson, PT

## 2024-03-29 NOTE — PROGRESS NOTES
Orthopaedics Daily Progress Note                            Date of Surgery:  3/28/2024    Patient: Yasmany Zamora III   YOB: 1955  Age: 68 y.o.      SUBJECTIVE:   1 Day Post-Op following LEFT TOTAL KNEE ARTHROPLASTY (SPINAL W/ BLOCK).      The patient's post operative pain is controlled.  No CP/SOB.  No N/V. The patient's mobility will be evaluated today during PT sessions.    Voiding OK with no evidence of POUR on bladder scan.     OBJECTIVE:     Vital Signs:    /79   Pulse 80   Temp 98.8 °F (37.1 °C) (Oral)   Resp 16   SpO2 96%     Physical Exam:  General: A&Ox3. The patient is cooperative, and in no acute distress.    Respiratory: Respirations are unlabored.  Surgical site(s): dressing clean, dry  Musculoskeletal: Calves are soft, supple, and non-tender upon palpation.  Motor 5/5.  Neurological:  Neurovascularly intact with good dorsi and plantar flexion.    Pulses symmetrical.    Laboratory Values:             Recent Results (from the past 12 hour(s))   Basic Metabolic Panel    Collection Time: 03/29/24  1:04 AM   Result Value Ref Range    Sodium 147 (H) 136 - 145 mmol/L    Potassium 4.1 3.5 - 5.1 mmol/L    Chloride 118 (H) 97 - 108 mmol/L    CO2 24 21 - 32 mmol/L    Anion Gap 5 5 - 15 mmol/L    Glucose 113 (H) 65 - 100 mg/dL    BUN 18 6 - 20 MG/DL    Creatinine 0.84 0.70 - 1.30 MG/DL    Bun/Cre Ratio 21 (H) 12 - 20      Est, Glom Filt Rate >90 >60 ml/min/1.73m2    Calcium 8.7 8.5 - 10.1 MG/DL   Hemoglobin and Hematocrit    Collection Time: 03/29/24  1:04 AM   Result Value Ref Range    Hemoglobin 9.3 (L) 12.1 - 17.0 g/dL    Hematocrit 27.7 (L) 36.6 - 50.3 %         PLAN:     S/P LEFT TOTAL KNEE ARTHROPLASTY (SPINAL W/ BLOCK) -Continue WBAT.  -Mobilize and continue with PT/OT until discharged     Hemodynamics Hgb today is 9.3.  Acute blood loss anemia as expected. Patient asymptomatic.  Continue to monitor.     Wound Monitor postop dressing; no postop dressing changes necessary.   Reinforce PRN.     Post Operative Pain Pain Control: stable, mild-to-moderate joint symptoms intermittently, reasonably well controlled by current meds.     DVT Prophylaxis Continue with SCD'S, Ankle Pump Exercises. ASA 81mg BID     Discharge Disposition Discharge plan: Home with HHPT pending PT clearance.       Signed By: Yin Allen PA-C  March 29, 2024 8:35 AM

## 2024-06-11 ENCOUNTER — OFFICE VISIT (OUTPATIENT)
Age: 69
End: 2024-06-11

## 2024-06-11 VITALS
RESPIRATION RATE: 15 BRPM | SYSTOLIC BLOOD PRESSURE: 132 MMHG | DIASTOLIC BLOOD PRESSURE: 77 MMHG | BODY MASS INDEX: 29.35 KG/M2 | TEMPERATURE: 97.9 F | OXYGEN SATURATION: 99 % | WEIGHT: 193 LBS | HEART RATE: 82 BPM

## 2024-06-11 DIAGNOSIS — L08.9 SUPERFICIAL SKIN INFECTION: Primary | ICD-10-CM

## 2024-06-11 RX ORDER — TRIAMCINOLONE ACETONIDE 1 MG/G
CREAM TOPICAL
COMMUNITY
Start: 2024-05-29

## 2024-06-11 NOTE — PATIENT INSTRUCTIONS
Your symptoms today are consistent with a superficial skin infection  Will treat topically with bactroban (mupirocin) 3x daily for the next 7 days  There is no evidence of any more serious infection at this time  Please closely monitor symptoms and follow up if you develop any fevers, chills, rapidly spreading redness or swelling or if developing purulent drainage

## 2024-06-11 NOTE — PROGRESS NOTES
and nursing note reviewed.   Constitutional:       General: He is not in acute distress.     Appearance: Normal appearance. He is not ill-appearing.   HENT:      Head: Normocephalic and atraumatic.   Cardiovascular:      Rate and Rhythm: Normal rate and regular rhythm.      Pulses: Normal pulses.   Pulmonary:      Effort: Pulmonary effort is normal. No respiratory distress.   Skin:     General: Skin is warm and dry.      Findings: Erythema and lesion present. No abrasion, abscess, bruising, signs of injury, rash or wound. Rash is not crusting or pustular.          Neurological:      General: No focal deficit present.      Mental Status: He is alert and oriented to person, place, and time.               An electronic signature was used to authenticate this note.    FILIBERTO Echeverria NP

## 2024-06-16 ENCOUNTER — HOSPITAL ENCOUNTER (EMERGENCY)
Facility: HOSPITAL | Age: 69
Discharge: HOME | End: 2024-06-17
Attending: EMERGENCY MEDICINE
Payer: MEDICARE

## 2024-06-16 VITALS
BODY MASS INDEX: 29.4 KG/M2 | OXYGEN SATURATION: 96 % | DIASTOLIC BLOOD PRESSURE: 97 MMHG | RESPIRATION RATE: 18 BRPM | HEART RATE: 97 BPM | SYSTOLIC BLOOD PRESSURE: 167 MMHG | WEIGHT: 193.34 LBS | TEMPERATURE: 98.2 F

## 2024-06-16 DIAGNOSIS — M79.89 LEFT LEG SWELLING: Primary | ICD-10-CM

## 2024-06-16 PROCEDURE — 99284 EMERGENCY DEPT VISIT MOD MDM: CPT

## 2024-06-16 ASSESSMENT — PAIN - FUNCTIONAL ASSESSMENT: PAIN_FUNCTIONAL_ASSESSMENT: NONE - DENIES PAIN

## 2024-06-17 ENCOUNTER — APPOINTMENT (OUTPATIENT)
Facility: HOSPITAL | Age: 69
End: 2024-06-17
Payer: MEDICARE

## 2024-06-17 PROCEDURE — 93971 EXTREMITY STUDY: CPT

## 2024-06-17 RX ORDER — CEPHALEXIN 500 MG/1
500 CAPSULE ORAL 2 TIMES DAILY
Qty: 14 CAPSULE | Refills: 0 | Status: SHIPPED | OUTPATIENT
Start: 2024-06-17 | End: 2024-06-24

## 2024-06-17 ASSESSMENT — ENCOUNTER SYMPTOMS
COLOR CHANGE: 1
SORE THROAT: 0
DIARRHEA: 0
COUGH: 0
SHORTNESS OF BREATH: 0
EYE PAIN: 0
VOMITING: 0
ABDOMINAL PAIN: 0
NAUSEA: 0

## 2024-06-17 NOTE — ED PROVIDER NOTES
[facility] EMERGENCY DEP  EMERGENCY DEPARTMENT ENCOUNTER      Pt Name: Yasmany Zamora III  MRN: [id number]  Birthdate 1955  Date of evaluation: 6/16/2024  Provider: MEGHANA LOTT    CHIEF COMPLAINT       Chief Complaint   Patient presents with    Leg Swelling         HISTORY OF PRESENT ILLNESS   (Location/Symptom, Timing/Onset, Context/Setting, Quality, Duration, Modifying Factors, Severity)  Note limiting factors.   68 y.o. male presents to ED with L leg swelling, redness and pain.  Patient reports that he had a left total knee replacement 10 weeks ago.  He first noticed redness, swelling and pain to his left lower leg this morning.  He went to an urgent care in [facility] where they ran a D-dimer which was elevated but they told him that they did not have an ultrasound.  They gave him a dose of Xarelto and told him and will return tomorrow for an ultrasound and if the ultrasound was positive they would give him the rest of the prescription for Xarelto.  Patient endorsed that he was concerned about waiting this long so decided to come in.  Denies any chest pain, shortness of breath, cough, fevers or chills.            Review of External Medical Records:     Nursing Notes were reviewed.    REVIEW OF SYSTEMS    (2-9 systems for level 4, 10 or more for level 5)     Review of Systems   Constitutional:  Negative for chills and fever.   HENT:  Negative for congestion, ear pain and sore throat.    Eyes:  Negative for pain.   Respiratory:  Negative for cough and shortness of breath.    Cardiovascular:  Positive for leg swelling. Negative for chest pain.   Gastrointestinal:  Negative for abdominal pain, diarrhea, nausea and vomiting.   Genitourinary:  Negative for dysuria and flank pain.   Musculoskeletal:  Negative for myalgias.   Skin:  Positive for color change. Negative for rash.   Neurological:  Negative for dizziness and headaches.   Hematological:  Negative for adenopathy.       Except as noted

## 2024-06-17 NOTE — ED TRIAGE NOTES
Pt arrived to ED via POV with CC of leg swelling.     Pt endorses total knee replacement of left knee 10wks ago without complication.     Today patient started noticing swelling to the left leg w/ color change distal to the surgical site. Pt was seen at [facility] today but they didn't have ultrasound available to assess for DVT. They ran a D-dimer that came back 3.42.     Pt denies SOB or CP. Pt did receive oral Xarelto today.

## 2024-12-03 NOTE — ACP (ADVANCE CARE PLANNING)
ACP documents have been done and he will provide to us. An upper respiratory infection is also called a cold. It can affect your nose, throat, ears, and sinuses. Cold symptoms are usually worst for the first 3 to 5 days. Most people get better in 7 to 14 days. You may continue to cough for 2 to 3 weeks. Colds are caused by viruses and do not get better with antibiotics.    DISCHARGE INSTRUCTIONS:    Call your local emergency number (911 in the [place]) if:  You have chest pain or trouble breathing.    Seek care immediately if:  You have a fever over 102ºF (39ºC).    Call your doctor if:  You have a low fever.  Your sore throat gets worse or you see white or yellow spots in your throat.  Your symptoms get worse after 3 to 5 days or are not better in 14 days.  You have a rash anywhere on your skin.  You have large, tender lumps in your neck.  You have thick, green, or yellow drainage from your nose.  You cough up thick yellow, green, or bloody mucus.  You have a bad earache.  You have questions or concerns about your condition or care.    Medicines:  You may need any of the following:    Decongestants help reduce nasal congestion and help you breathe more easily. If you take decongestant pills, they may make you feel restless or cause problems with your sleep. Do not use decongestant sprays for more than a few days.    Cough suppressants help reduce coughing. Ask your healthcare provider which type of cough medicine is best for you.    NSAIDs , such as ibuprofen, help decrease swelling, pain, and fever. NSAIDs can cause stomach bleeding or kidney problems in certain people. If you take blood thinner medicine, always ask your healthcare provider if NSAIDs are safe for you. Always read the medicine label and follow directions.    Acetaminophen decreases pain and fever. It is available without a doctor's order. Ask how much to take and how often to take it. Follow directions. Read the labels of all other medicines you are using to see if they also contain acetaminophen, or ask  your doctor or pharmacist. Acetaminophen can cause liver damage if not taken correctly.    Take your medicine as directed. Contact your healthcare provider if you think your medicine is not helping or if you have side effects. Tell your provider if you are allergic to any medicine. Keep a list of the medicines, vitamins, and herbs you take. Include the amounts, and when and why you take them. Bring the list or the pill bottles to follow-up visits. Carry your medicine list with you in case of an emergency.    Self-care:  Rest as much as possible. Slowly start to do more each day.    Drink more liquids as directed. Liquids will help thin and loosen mucus so you can cough it up. Liquids will also help prevent dehydration. Liquids that help prevent dehydration include water, fruit juice, and broth. Do not drink liquids that contain caffeine. Caffeine can increase your risk for dehydration. Ask your healthcare provider how much liquid to drink each day.  Soothe a sore throat. Gargle with warm salt water. Make salt water by dissolving ¼ teaspoon salt in 1 cup warm water. You may also suck on hard candy or throat lozenges. You may use a sore throat spray.    Use a humidifier or vaporizer. Use a cool mist humidifier or a vaporizer to increase air moisture in your home. This may make it easier for you to breathe and help decrease your cough.  Use saline nasal drops as directed. These help relieve congestion.    Apply petroleum-based jelly around the outside of your nostrils. This can decrease irritation from blowing your nose.    Do not smoke. Nicotine and other chemicals in cigarettes and cigars can make your symptoms worse. They can also cause infections such as bronchitis or pneumonia. Ask your healthcare provider for information if you currently smoke and need help to quit. E-cigarettes or smokeless tobacco still contain nicotine. Talk to your healthcare provider before you use these products.    Prevent a cold:  Wash  your hands often. Use soap and water every time you wash your hands. Rub your soapy hands together, lacing your fingers. Use the fingers of one hand to scrub under the nails of the other hand. Wash for at least 20 seconds. Rinse with warm, running water for several seconds. Then dry your hands. Use hand  gel if soap and water are not available. Do not touch your eyes or mouth without washing your hands first.    Handwashing    Cover a sneeze or cough. Use a tissue that covers your mouth and nose. Put the used tissue in the trash right away. Use the bend of your arm if a tissue is not available. Wash your hands well with soap and water or use a hand . Do not stand close to anyone who is sneezing or coughing.    Try to stay away from others while you are sick. This is especially important during the first 2 to 3 days when the virus is more easily spread. Wait until a fever, cough, or other symptoms are gone before you return to work or other regular activities.    Do not share items while you are sick. This includes food, drinks, eating utensils, and dishes.

## 2025-01-12 ENCOUNTER — HOSPITAL ENCOUNTER (EMERGENCY)
Facility: HOSPITAL | Age: 70
Discharge: HOME | End: 2025-01-12
Payer: MEDICARE

## 2025-01-12 VITALS
TEMPERATURE: 97.9 F | BODY MASS INDEX: 29.77 KG/M2 | HEIGHT: 68 IN | HEART RATE: 89 BPM | OXYGEN SATURATION: 98 % | RESPIRATION RATE: 18 BRPM | SYSTOLIC BLOOD PRESSURE: 135 MMHG | DIASTOLIC BLOOD PRESSURE: 78 MMHG | WEIGHT: 196.43 LBS

## 2025-01-12 DIAGNOSIS — J11.1 INFLUENZA WITH RESPIRATORY MANIFESTATION OTHER THAN PNEUMONIA: Primary | ICD-10-CM

## 2025-01-12 DIAGNOSIS — R05.1 ACUTE COUGH: ICD-10-CM

## 2025-01-12 LAB
FLUAV RNA SPEC QL NAA+PROBE: DETECTED
FLUBV RNA SPEC QL NAA+PROBE: NOT DETECTED
SARS-COV-2 RNA RESP QL NAA+PROBE: NOT DETECTED
SOURCE: ABNORMAL

## 2025-01-12 PROCEDURE — 87636 SARSCOV2 & INF A&B AMP PRB: CPT

## 2025-01-12 PROCEDURE — 99283 EMERGENCY DEPT VISIT LOW MDM: CPT

## 2025-01-12 RX ORDER — PREDNISONE 20 MG/1
40 TABLET ORAL DAILY
Qty: 6 TABLET | Refills: 0 | Status: SHIPPED | OUTPATIENT
Start: 2025-01-12 | End: 2025-01-15

## 2025-01-12 RX ORDER — AZITHROMYCIN 250 MG/1
TABLET, FILM COATED ORAL
Qty: 1 PACKET | Refills: 0 | Status: SHIPPED | OUTPATIENT
Start: 2025-01-12 | End: 2025-01-16

## 2025-01-12 ASSESSMENT — PAIN SCALES - GENERAL: PAINLEVEL_OUTOF10: 0

## 2025-01-12 NOTE — DISCHARGE INSTRUCTIONS
Begin taking the antibiotics if you develop worsening symptoms, including fevers, worsening cough. Return to the ED if you develop any concerning symptoms, including vomiting or difficulty breathing.     Although you are being discharged, it is important to know that the evaluation and treatment you received in the Emergency Department was provided on an urgent or emergent basis only. It is not always possible to address all elements of illness and injury in a single visit, so please make sure to follow up with your primary care doctor within the next few days.    If you have further questions about new prescriptions provided today, please consult with your pharmacist when you pick them up.     You may return to the Emergency Department at any time if your symptoms get worse, or you do not get better    Best wishes and hope you feel better soon.

## 2025-01-12 NOTE — ED PROVIDER NOTES
subjective chills however feels much better today.  States he overall feels well, no fevers, headaches, dizziness, myalgias, chest pain, difficulty breathing, hemoptysis, nausea, vomiting abdominal pain, diarrhea.  Denies past history of tobacco use.  States he has a family member, his wife with similar symptoms    Differential diagnosis includes but not limited to, URI, viral syndrome, pneumonia, bronchitis, asthma exacerbation,.    Patient well-appearing, no adventitious breath sounds, no signs of respiratory distress, unremarkable vital signs.  He is influenza A+.  Considered chest x-ray but do not feel this would .  Do not feel he would benefit from antibiotics at this time, however prescribed azithromycin should he develop worsening symptoms to cover for superimposed bacterial infection.  Tolerating p.o. without difficulty.  Discussed follow-up with PCP as needed, as well as return precautions ED.  Patient expressed understanding of the discharge instructions and treatment plan.             FINAL IMPRESSION     1. Influenza with respiratory manifestation other than pneumonia    2. Acute cough          DISPOSITION/PLAN   DISPOSITION Decision To Discharge 01/12/2025 10:49:50 AM   DISPOSITION CONDITION Stable             Care plan outlined and precautions discussed.  Patient has no new complaints, changes, or physical findings.  Results of evaluation were reviewed with the patient. All medications were reviewed with the patient; will d/c home with prednisone and azithromycin. All of pt's questions and concerns were addressed. Patient was instructed and agrees to follow up with PCP, as well as to return to the ED upon further deterioration. Patient is ready to go home.      PATIENT REFERRED TO:  Siva Marcus MD  [address] [place] [id number] [phone]    Schedule an appointment as soon as possible for a visit       [facility] Emergency Department  [address] [place] [address]

## 2025-02-27 ENCOUNTER — APPOINTMENT (OUTPATIENT)
Dept: URBAN - METROPOLITAN AREA CLINIC 277 | Age: 70
Setting detail: DERMATOLOGY
End: 2025-02-27

## 2025-02-27 DIAGNOSIS — Z85.828 PERSONAL HISTORY OF OTHER MALIGNANT NEOPLASM OF SKIN: ICD-10-CM

## 2025-02-27 PROCEDURE — OTHER MIPS QUALITY: OTHER

## 2025-02-27 PROCEDURE — OTHER COUNSELING: OTHER

## 2025-02-27 PROCEDURE — OTHER ADDITIONAL NOTES: OTHER

## 2025-02-27 PROCEDURE — 99202 OFFICE O/P NEW SF 15 MIN: CPT

## 2025-02-27 PROCEDURE — OTHER OBSERVATION: OTHER

## 2025-02-27 ASSESSMENT — LOCATION DETAILED DESCRIPTION DERM
LOCATION DETAILED: MID-FRONTAL SCALP
LOCATION DETAILED: LEFT PROXIMAL LATERAL CALF
LOCATION DETAILED: LEFT SUPERIOR MEDIAL MALAR CHEEK
LOCATION DETAILED: LEFT LATERAL PROXIMAL PRETIBIAL REGION

## 2025-02-27 ASSESSMENT — LOCATION SIMPLE DESCRIPTION DERM
LOCATION SIMPLE: ANTERIOR SCALP
LOCATION SIMPLE: LEFT CHEEK
LOCATION SIMPLE: LEFT PRETIBIAL REGION
LOCATION SIMPLE: LEFT CALF

## 2025-02-27 ASSESSMENT — LOCATION ZONE DERM
LOCATION ZONE: LEG
LOCATION ZONE: FACE
LOCATION ZONE: SCALP

## 2025-02-27 NOTE — PROCEDURE: ADDITIONAL NOTES
Additional Notes: BCC x2 treated with Mohs with other surgeon in the past
Detail Level: Simple
Render Risk Assessment In Note?: no
Additional Notes: SCC x2 treated with Mohs with other surgeon in the past

## 2025-02-27 NOTE — HPI: SKIN LESION
How Severe Is Your Skin Lesion?: moderate
Has Your Skin Lesion Been Treated?: not been treated
Is This A New Presentation, Or A Follow-Up?: Skin Lesion
Additional History: Pt interested in [facility]

## 2025-03-31 ENCOUNTER — TRANSCRIBE ORDERS (OUTPATIENT)
Facility: HOSPITAL | Age: 70
End: 2025-03-31

## 2025-03-31 DIAGNOSIS — M19.011 PRIMARY OSTEOARTHRITIS OF RIGHT SHOULDER: Primary | ICD-10-CM

## 2025-04-09 ENCOUNTER — HOSPITAL ENCOUNTER (OUTPATIENT)
Facility: HOSPITAL | Age: 70
Discharge: HOME | End: 2025-04-12
Attending: ORTHOPAEDIC SURGERY
Payer: MEDICARE

## 2025-04-09 DIAGNOSIS — M19.011 PRIMARY OSTEOARTHRITIS OF RIGHT SHOULDER: ICD-10-CM

## 2025-04-09 PROCEDURE — 73221 MRI JOINT UPR EXTREM W/O DYE: CPT

## 2025-06-10 ENCOUNTER — HOSPITAL ENCOUNTER (OUTPATIENT)
Age: 70
Discharge: HOME | End: 2025-06-13
Payer: MEDICARE

## 2025-06-10 DIAGNOSIS — M54.50 CHRONIC BILATERAL LOW BACK PAIN WITHOUT SCIATICA: ICD-10-CM

## 2025-06-10 DIAGNOSIS — G89.29 CHRONIC BILATERAL LOW BACK PAIN WITHOUT SCIATICA: ICD-10-CM

## 2025-06-10 PROCEDURE — 72148 MRI LUMBAR SPINE W/O DYE: CPT

## (undated) DEVICE — SHOULDER SUSPENSION KIT 6 PER BOX

## (undated) DEVICE — SUTURE MCRYL SZ 3-0 L27IN ABSRB UD L24MM PS-1 3/8 CIR PRIM Y936H

## (undated) DEVICE — BANDAGE COMPR W6INXL12FT SMOOTH FOR LIMB EXSANG ESMARCH

## (undated) DEVICE — SOLUTION SURG PREP 26 CC PURPREP

## (undated) DEVICE — NEEDLE HYPO 21GA L1IN GRN S STL HUB POLYPR SHLD REG BVL

## (undated) DEVICE — TUBING, SUCTION, 9/32" X 12', STRAIGHT: Brand: MEDLINE INDUSTRIES, INC.

## (undated) DEVICE — HANDPIECE SET WITH BONE CLEANING TIP AND SUCTION TUBE: Brand: INTERPULSE

## (undated) DEVICE — SPONGE GZ W4XL4IN COT RADPQ HIGHLY ABSRB

## (undated) DEVICE — 3M™ MEDIPORE™ H SOFT CLOTH SURGICAL TAPE, 2863, 3 IN X 10 YD, 12/CASE: Brand: 3M™ MEDIPORE™

## (undated) DEVICE — 3M™ STERI-DRAPE™ U-DRAPE 1015: Brand: STERI-DRAPE™

## (undated) DEVICE — STERILE POLYISOPRENE POWDER-FREE SURGICAL GLOVES WITH EMOLLIENT COATING: Brand: PROTEXIS

## (undated) DEVICE — SET IRRIG W 96IN TBNG 4 LN FLX BG

## (undated) DEVICE — SOLUTION IRRIG 3000ML LAC RINGERS ARTHROMTC PLAS CONT

## (undated) DEVICE — ELECTRODE PT RET AD L9FT HI MOIST COND ADH HYDRGEL CORDED

## (undated) DEVICE — BANDAGE COMPR M W6INXL10YD WHT BGE VELC E MTRX HK AND LOOP

## (undated) DEVICE — CONTAINER,SPECIMEN,4OZ,OR STRL: Brand: MEDLINE

## (undated) DEVICE — SPONGE GZ W4XL4IN COT 12 PLY TYP VII WVN C FLD DSGN

## (undated) DEVICE — INTENT OT USE PROVIDES A STERILE INTERFACE BETWEEN THE OPERATING ROOM SURGICAL LAMPS (NON-STERILE) AND THE SURGEON OR STAFF WORKING IN THE STERILE FIELD.: Brand: ASPEN® ALC PLUS LIGHT HANDLE COVER

## (undated) DEVICE — 4.5 MM INCISOR PLUS STRAIGHT                                    BLADES, POWER/EP-1, VIOLET, PACKAGED                                    6 PER BOX, STERILE

## (undated) DEVICE — SUTURE FIBERLOOP SZ 2-0 L20IN NONABSORBABLE BLU STR NDL AR7234

## (undated) DEVICE — 5.0 MM I.D. UNIVERSAL CANNULA, 76                                    MM LONG, BLUE, LATEX SEAL,                                    SINGLE-USE STERILE PACKS, BOX OF 10.                                    INCLUDES OBTURATOR AND TROCAR

## (undated) DEVICE — STERILE POLYISOPRENE POWDER-FREE SURGICAL GLOVES: Brand: PROTEXIS

## (undated) DEVICE — SYRINGE MED 10ML LUERLOCK TIP W/O SFTY DISP

## (undated) DEVICE — GLOVE SURG SZ 65 L12IN FNGR THK94MIL STD WHT LTX FREE

## (undated) DEVICE — YANKAUER,FLEXIBLE HANDLE,REGLR CAPACITY: Brand: MEDLINE INDUSTRIES, INC.

## (undated) DEVICE — SHEET,DRAPE,53X77,STERILE: Brand: MEDLINE

## (undated) DEVICE — SYSTEM NAVIGATION PALM SZ PRECIS ALIGN TECHNOLOGY DISP FOR

## (undated) DEVICE — TOTAL JOINT - SMH: Brand: MEDLINE INDUSTRIES, INC.

## (undated) DEVICE — SOLUTION IRRIG 3000ML 0.9% SOD CHL USP UROMATIC PLAS CONT

## (undated) DEVICE — SYRINGE 20ML LL S/C 50

## (undated) DEVICE — 4-PORT MANIFOLD: Brand: NEPTUNE 2

## (undated) DEVICE — LIQUIBAND RAPID ADHESIVE 36/CS 0.8ML: Brand: MEDLINE

## (undated) DEVICE — SPONGE GZ W4XL4IN COT 12 PLY TYP VII WVN C FLD DSGN STERILE

## (undated) DEVICE — MARKER,SKIN,WI/RULER AND LABELS: Brand: MEDLINE

## (undated) DEVICE — GOWN,PREVENTION PLUS,XLN/XL,ST,24/CS: Brand: MEDLINE

## (undated) DEVICE — CUSTOM CAST PD STR

## (undated) DEVICE — Z DISCONTINUED USE 2860885 SUTURE STRATAFIX SPRL SZ 1 L14IN ABSRB VLT L48CM CTX 1/2 SXPD2B405

## (undated) DEVICE — TIP SUCT CRV REG REDI

## (undated) DEVICE — GLOVE ORTHO 8   MSG9480

## (undated) DEVICE — GARMENT,MEDLINE,DVT,INT,CALF,MED, GEN2: Brand: MEDLINE

## (undated) DEVICE — DRAPE,REIN 53X77,STERILE: Brand: MEDLINE

## (undated) DEVICE — GLOVE SURG SZ 65 THK91MIL LTX FREE SYN POLYISOPRENE

## (undated) DEVICE — ZIPPERED TOGA, X-LARGE: Brand: FLYTE, SURGICOOL

## (undated) DEVICE — DRESSING HYDROFIBER AQUACEL AG ADVANTAGE 3.5X14 IN

## (undated) DEVICE — TUBING, SUCTION, 1/4" X 12', STRAIGHT: Brand: MEDLINE

## (undated) DEVICE — ZIPPERED TOGA, 2X LARGE: Brand: FLYTE, SURGICOOL

## (undated) DEVICE — PREP SKN CHLRAPRP APL 26ML STR --

## (undated) DEVICE — SUTURE VCRL SZ 0 L27IN ABSRB UD L36MM CT-1 1/2 CIR J260H

## (undated) DEVICE — STRYKER PERFORMANCE SERIES SAGITTAL BLADE: Brand: STRYKER PERFORMANCE SERIES

## (undated) DEVICE — APPLICATOR MEDICATED 26 CC SOLUTION HI LT ORNG CHLORAPREP

## (undated) DEVICE — TAPE,CLOTH/SILK,CURAD,3"X10YD,LF,40/CS: Brand: CURAD

## (undated) DEVICE — 4.5 MM HELICUT STRAIGHT BURRS,                                    POWER/EP-1, SLATE, 5000 MAXIMUM RPM,                                    PACKAGED 6 PER BOX, STERILE: Brand: DYONICS HELICUT

## (undated) DEVICE — Z DISC USE 2428345 SUTURE VCRL SZ 2-0 L36IN ABSRB UD L40MM CT 1/2 CIR J957H

## (undated) DEVICE — PADDING CAST W6INXL4YD NONSTERILE COT RAYON MICROPLEATED

## (undated) DEVICE — GLOVE SURG SZ 65 L12IN FNGR THK79MIL GRN LTX FREE

## (undated) DEVICE — SUT ETHLN 3-0 18IN PS2 BLK --

## (undated) DEVICE — ARTHROSCOPY - RICHMOND: Brand: MEDLINE INDUSTRIES, INC.

## (undated) DEVICE — GRASPER SUT 60DEG SHRP TIP LO PROF FOR RAP ACCS IN SHLDR

## (undated) DEVICE — SCRUBIN SCRUB BRUSH DRY STER: Brand: MEDLINE INDUSTRIES, INC.

## (undated) DEVICE — SUTURE VCRL 1 L27IN ABSRB CT BRAID COAT UD J281H

## (undated) DEVICE — ZIMMER® STERILE DISPOSABLE TOURNIQUET CUFF WITH PLC, DUAL PORT, SINGLE BLADDER, 34 IN. (86 CM)

## (undated) DEVICE — DRAPE,EXTREMITY,89X128,STERILE: Brand: MEDLINE